# Patient Record
Sex: MALE | Race: WHITE | NOT HISPANIC OR LATINO | Employment: OTHER | URBAN - NONMETROPOLITAN AREA
[De-identification: names, ages, dates, MRNs, and addresses within clinical notes are randomized per-mention and may not be internally consistent; named-entity substitution may affect disease eponyms.]

---

## 2017-03-02 ENCOUNTER — TRANSCRIBE ORDERS (OUTPATIENT)
Dept: LAB | Facility: HOSPITAL | Age: 62
End: 2017-03-02

## 2017-03-02 ENCOUNTER — HOSPITAL ENCOUNTER (OUTPATIENT)
Dept: GENERAL RADIOLOGY | Facility: HOSPITAL | Age: 62
Discharge: HOME | End: 2017-03-02
Admitting: NURSE PRACTITIONER

## 2017-03-02 DIAGNOSIS — M54.5 LOW BACK PAIN, UNSPECIFIED BACK PAIN LATERALITY, UNSPECIFIED CHRONICITY, WITH SCIATICA PRESENCE UNSPECIFIED: ICD-10-CM

## 2017-03-02 DIAGNOSIS — M51.36 DEGENERATION OF LUMBAR INTERVERTEBRAL DISC: Primary | ICD-10-CM

## 2017-03-02 DIAGNOSIS — M51.36 DEGENERATION OF LUMBAR INTERVERTEBRAL DISC: ICD-10-CM

## 2017-03-02 PROCEDURE — 72110 X-RAY EXAM L-2 SPINE 4/>VWS: CPT

## 2017-03-14 ENCOUNTER — APPOINTMENT (OUTPATIENT)
Dept: CT IMAGING | Age: 62
End: 2017-03-14
Payer: COMMERCIAL

## 2017-03-14 ENCOUNTER — HOSPITAL ENCOUNTER (EMERGENCY)
Age: 62
Discharge: HOME | End: 2017-03-14
Payer: COMMERCIAL

## 2017-03-14 VITALS
HEART RATE: 64 BPM | SYSTOLIC BLOOD PRESSURE: 139 MMHG | DIASTOLIC BLOOD PRESSURE: 82 MMHG | WEIGHT: 233 LBS | OXYGEN SATURATION: 96 % | RESPIRATION RATE: 18 BRPM | BODY MASS INDEX: 32.62 KG/M2 | TEMPERATURE: 97.9 F | HEIGHT: 71 IN

## 2017-03-14 DIAGNOSIS — M54.31 SCIATICA OF RIGHT SIDE: Primary | ICD-10-CM

## 2017-03-14 DIAGNOSIS — R10.31 RIGHT LOWER QUADRANT ABDOMINAL PAIN: ICD-10-CM

## 2017-03-14 LAB
ALBUMIN SERPL-MCNC: 4.4 G/DL (ref 3.5–5.2)
ALP BLD-CCNC: 65 U/L (ref 40–130)
ALT SERPL-CCNC: 25 U/L (ref 5–41)
ANION GAP SERPL CALCULATED.3IONS-SCNC: 9 MMOL/L (ref 7–19)
AST SERPL-CCNC: 20 U/L (ref 5–40)
BILIRUB SERPL-MCNC: 0.3 MG/DL (ref 0.2–1.2)
BILIRUBIN URINE: NEGATIVE
BLOOD, URINE: NEGATIVE
BUN BLDV-MCNC: 19 MG/DL (ref 8–23)
CALCIUM SERPL-MCNC: 9.3 MG/DL (ref 8.8–10.2)
CHLORIDE BLD-SCNC: 103 MMOL/L (ref 98–111)
CLARITY: CLEAR
CO2: 27 MMOL/L (ref 22–29)
COLOR: YELLOW
CREAT SERPL-MCNC: 1.1 MG/DL (ref 0.5–1.2)
GFR NON-AFRICAN AMERICAN: >60
GLOBULIN: 2.3 G/DL
GLUCOSE BLD-MCNC: 94 MG/DL (ref 74–109)
GLUCOSE URINE: NEGATIVE MG/DL
HCT VFR BLD CALC: 42.2 % (ref 42–52)
HEMOGLOBIN: 14.7 G/DL (ref 14–18)
KETONES, URINE: NEGATIVE MG/DL
LEUKOCYTE ESTERASE, URINE: NEGATIVE
MCH RBC QN AUTO: 30.9 PG (ref 27–31)
MCHC RBC AUTO-ENTMCNC: 34.8 G/DL (ref 33–37)
MCV RBC AUTO: 88.7 FL (ref 80–94)
NITRITE, URINE: NEGATIVE
PDW BLD-RTO: 13.7 % (ref 11.5–14.5)
PH UA: 5.5
PLATELET # BLD: 226 K/UL (ref 130–400)
PMV BLD AUTO: 8.7 FL (ref 7.4–10.4)
POTASSIUM SERPL-SCNC: 4.4 MMOL/L (ref 3.5–5)
PROTEIN UA: NEGATIVE MG/DL
RBC # BLD: 4.76 M/UL (ref 4.7–6.1)
SODIUM BLD-SCNC: 139 MMOL/L (ref 136–145)
SPECIFIC GRAVITY UA: >1.045
TOTAL PROTEIN: 6.7 G/DL (ref 6.6–8.7)
UROBILINOGEN, URINE: 1 E.U./DL
WBC # BLD: 7.1 K/UL (ref 4.8–10.8)

## 2017-03-14 PROCEDURE — 81003 URINALYSIS AUTO W/O SCOPE: CPT

## 2017-03-14 PROCEDURE — 74177 CT ABD & PELVIS W/CONTRAST: CPT

## 2017-03-14 PROCEDURE — 96375 TX/PRO/DX INJ NEW DRUG ADDON: CPT

## 2017-03-14 PROCEDURE — 36415 COLL VENOUS BLD VENIPUNCTURE: CPT

## 2017-03-14 PROCEDURE — 6360000004 HC RX CONTRAST MEDICATION: Performed by: NURSE PRACTITIONER

## 2017-03-14 PROCEDURE — 96376 TX/PRO/DX INJ SAME DRUG ADON: CPT

## 2017-03-14 PROCEDURE — 99284 EMERGENCY DEPT VISIT MOD MDM: CPT

## 2017-03-14 PROCEDURE — 96374 THER/PROPH/DIAG INJ IV PUSH: CPT

## 2017-03-14 PROCEDURE — 80053 COMPREHEN METABOLIC PANEL: CPT

## 2017-03-14 PROCEDURE — 6360000002 HC RX W HCPCS: Performed by: NURSE PRACTITIONER

## 2017-03-14 PROCEDURE — 85027 COMPLETE CBC AUTOMATED: CPT

## 2017-03-14 PROCEDURE — 99283 EMERGENCY DEPT VISIT LOW MDM: CPT | Performed by: NURSE PRACTITIONER

## 2017-03-14 RX ORDER — KETOROLAC TROMETHAMINE 30 MG/ML
15 INJECTION, SOLUTION INTRAMUSCULAR; INTRAVENOUS ONCE
Status: COMPLETED | OUTPATIENT
Start: 2017-03-14 | End: 2017-03-14

## 2017-03-14 RX ORDER — CYCLOBENZAPRINE HCL 10 MG
10 TABLET ORAL 3 TIMES DAILY PRN
Qty: 30 TABLET | Refills: 0 | Status: SHIPPED | OUTPATIENT
Start: 2017-03-14 | End: 2019-05-20 | Stop reason: ALTCHOICE

## 2017-03-14 RX ORDER — METHYLPREDNISOLONE 4 MG/1
TABLET ORAL
Qty: 1 KIT | Refills: 0 | Status: SHIPPED | OUTPATIENT
Start: 2017-03-14 | End: 2017-03-20

## 2017-03-14 RX ORDER — ONDANSETRON 2 MG/ML
4 INJECTION INTRAMUSCULAR; INTRAVENOUS ONCE
Status: COMPLETED | OUTPATIENT
Start: 2017-03-14 | End: 2017-03-14

## 2017-03-14 RX ADMIN — HYDROMORPHONE HYDROCHLORIDE 1 MG: 1 INJECTION, SOLUTION INTRAMUSCULAR; INTRAVENOUS; SUBCUTANEOUS at 14:44

## 2017-03-14 RX ADMIN — IOVERSOL 100 ML: 741 INJECTION INTRA-ARTERIAL; INTRAVENOUS at 15:30

## 2017-03-14 RX ADMIN — HYDROMORPHONE HYDROCHLORIDE 1 MG: 1 INJECTION, SOLUTION INTRAMUSCULAR; INTRAVENOUS; SUBCUTANEOUS at 17:14

## 2017-03-14 RX ADMIN — KETOROLAC TROMETHAMINE 15 MG: 30 INJECTION, SOLUTION INTRAMUSCULAR at 17:14

## 2017-03-14 RX ADMIN — ONDANSETRON 4 MG: 2 INJECTION INTRAMUSCULAR; INTRAVENOUS at 14:44

## 2017-03-14 ASSESSMENT — PAIN SCALES - GENERAL
PAINLEVEL_OUTOF10: 7

## 2017-03-14 ASSESSMENT — PAIN DESCRIPTION - ORIENTATION: ORIENTATION: RIGHT

## 2017-03-14 ASSESSMENT — PAIN DESCRIPTION - LOCATION: LOCATION: LEG

## 2017-03-14 ASSESSMENT — ENCOUNTER SYMPTOMS: RESPIRATORY NEGATIVE: 1

## 2017-03-14 ASSESSMENT — PAIN DESCRIPTION - PAIN TYPE: TYPE: ACUTE PAIN

## 2017-03-16 ENCOUNTER — TRANSCRIBE ORDERS (OUTPATIENT)
Dept: ADMINISTRATIVE | Facility: HOSPITAL | Age: 62
End: 2017-03-16

## 2017-03-16 DIAGNOSIS — I73.9 PERIPHERAL VASCULAR DISEASE, UNSPECIFIED (HCC): ICD-10-CM

## 2017-03-16 DIAGNOSIS — M51.36 DEGENERATION OF LUMBAR INTERVERTEBRAL DISC: Primary | ICD-10-CM

## 2017-03-17 ENCOUNTER — HOSPITAL ENCOUNTER (OUTPATIENT)
Dept: ULTRASOUND IMAGING | Facility: HOSPITAL | Age: 62
Discharge: HOME | End: 2017-03-17

## 2017-03-17 ENCOUNTER — TRANSCRIBE ORDERS (OUTPATIENT)
Dept: ULTRASOUND IMAGING | Facility: HOSPITAL | Age: 62
End: 2017-03-17

## 2017-03-17 ENCOUNTER — HOSPITAL ENCOUNTER (OUTPATIENT)
Dept: ULTRASOUND IMAGING | Facility: HOSPITAL | Age: 62
Discharge: HOME | End: 2017-03-17
Admitting: NURSE PRACTITIONER

## 2017-03-17 ENCOUNTER — HOSPITAL ENCOUNTER (OUTPATIENT)
Dept: GENERAL RADIOLOGY | Facility: HOSPITAL | Age: 62
Discharge: HOME | End: 2017-03-17

## 2017-03-17 DIAGNOSIS — M79.604 RIGHT LEG PAIN: ICD-10-CM

## 2017-03-17 DIAGNOSIS — I73.9 PERIPHERAL VASCULAR DISEASE, UNSPECIFIED (HCC): ICD-10-CM

## 2017-03-17 DIAGNOSIS — D49.59 NEOPLASM OF UNSPECIFIED BEHAVIOR OF OTHER GENITOURINARY ORGAN: ICD-10-CM

## 2017-03-17 DIAGNOSIS — M79.605 LEFT LEG PAIN: ICD-10-CM

## 2017-03-17 DIAGNOSIS — I73.9 PERIPHERAL VASCULAR DISEASE, UNSPECIFIED (HCC): Primary | ICD-10-CM

## 2017-03-17 PROCEDURE — 93970 EXTREMITY STUDY: CPT

## 2017-03-17 PROCEDURE — 76775 US EXAM ABDO BACK WALL LIM: CPT

## 2017-03-20 ENCOUNTER — TRANSCRIBE ORDERS (OUTPATIENT)
Dept: GENERAL RADIOLOGY | Facility: HOSPITAL | Age: 62
End: 2017-03-20

## 2017-03-20 ENCOUNTER — HOSPITAL ENCOUNTER (OUTPATIENT)
Dept: ULTRASOUND IMAGING | Facility: HOSPITAL | Age: 62
Discharge: HOME | End: 2017-03-20
Admitting: NURSE PRACTITIONER

## 2017-03-20 DIAGNOSIS — I73.9 PERIPHERAL VASCULAR DISEASE, UNSPECIFIED (HCC): ICD-10-CM

## 2017-03-20 PROCEDURE — 93922 UPR/L XTREMITY ART 2 LEVELS: CPT

## 2017-03-20 PROCEDURE — 93923 UPR/LXTR ART STDY 3+ LVLS: CPT

## 2017-03-22 ENCOUNTER — HOSPITAL ENCOUNTER (OUTPATIENT)
Dept: MRI IMAGING | Facility: HOSPITAL | Age: 62
Discharge: HOME | End: 2017-03-22
Admitting: NURSE PRACTITIONER

## 2017-03-22 DIAGNOSIS — M51.36 DEGENERATION OF LUMBAR INTERVERTEBRAL DISC: ICD-10-CM

## 2017-03-22 PROCEDURE — 72148 MRI LUMBAR SPINE W/O DYE: CPT

## 2017-05-12 ENCOUNTER — TELEPHONE (OUTPATIENT)
Dept: CARDIOLOGY | Age: 62
End: 2017-05-12

## 2017-07-11 DIAGNOSIS — I25.10 CORONARY ARTERY DISEASE INVOLVING NATIVE CORONARY ARTERY OF NATIVE HEART WITHOUT ANGINA PECTORIS: ICD-10-CM

## 2017-07-12 RX ORDER — METOPROLOL SUCCINATE 50 MG/1
TABLET, EXTENDED RELEASE ORAL
Qty: 30 TABLET | Refills: 5 | Status: SHIPPED | OUTPATIENT
Start: 2017-07-12 | End: 2017-10-11 | Stop reason: SDUPTHER

## 2017-10-11 ENCOUNTER — OFFICE VISIT (OUTPATIENT)
Dept: CARDIOLOGY | Age: 62
End: 2017-10-11
Payer: COMMERCIAL

## 2017-10-11 VITALS
WEIGHT: 242 LBS | SYSTOLIC BLOOD PRESSURE: 138 MMHG | BODY MASS INDEX: 34.65 KG/M2 | DIASTOLIC BLOOD PRESSURE: 86 MMHG | HEART RATE: 72 BPM | HEIGHT: 70 IN

## 2017-10-11 DIAGNOSIS — Z95.1 S/P CABG X 2: ICD-10-CM

## 2017-10-11 DIAGNOSIS — I10 ESSENTIAL HYPERTENSION: ICD-10-CM

## 2017-10-11 DIAGNOSIS — E78.2 MIXED HYPERLIPIDEMIA: ICD-10-CM

## 2017-10-11 DIAGNOSIS — I25.10 CORONARY ARTERY DISEASE INVOLVING NATIVE CORONARY ARTERY OF NATIVE HEART WITHOUT ANGINA PECTORIS: Primary | ICD-10-CM

## 2017-10-11 PROCEDURE — 99213 OFFICE O/P EST LOW 20 MIN: CPT | Performed by: NURSE PRACTITIONER

## 2017-10-11 RX ORDER — MELOXICAM 15 MG/1
15 TABLET ORAL PRN
COMMUNITY

## 2017-10-11 NOTE — PROGRESS NOTES
[facility] [place], [place]. [address] [facility]  [phone] office  [phone] fax      OFFICE VISIT:  10/11/2017    Kurt Mcdonald: 1955    Reason For Visit:  Marlin Alfaro is a 57 y.o. male who is here for Follow-up (Presents for cardiology follow up doing well.); Coronary Artery Disease; Hypertension; and Hyperlipidemia    The patient presents today for cardiology follow up. Overall, the patient is doing well from a cardiac standpoint without symptoms to suggest myocardial ischemia. BP is well controlled on current regimen. The patient's PCP monitors cholesterol. Ruy Askew denies exertional chest pain, shortness of breath, orthopnea, paroxysmal nocturnal dyspnea, syncope, presyncope, sustained arrythmia, edema and fatigue. The patient denies numbness or weakness to suggest cerebrovascular accident or transient ischemic attack. Radha Steven has the following history as recorded in [facility]:    Patient Active Problem List   Diagnosis Code    Hypertension I10    Chronic obstructive pulmonary disease (COPD) ([facility] [address] J44.9    Dyspnea R06.00    Chest pain R07.9    H/O: rheumatic fever Z86.79    CAD (coronary artery disease) I25.10    S/P CABG x 2 Z95.1    Fatigue R53.83    Hyperlipidemia E78.5     Past Medical History:   Diagnosis Date    Arthritis     POLYARTICULAR    CAD (coronary artery disease)     CAD (coronary artery disease) 10/31/2012    S/P CABG    Chest pain     Chronic obstructive pulmonary disease (COPD) ([facility]     Depression     Dyspnea     GERD (gastroesophageal reflux disease)     H/O: rheumatic fever     Hyperlipidemia     Cholesterol management per pcp.  Hypertension      Past Surgical History:   Procedure Laterality Date    CARDIAC CATHETERIZATION  9/11/12   MDL    with aortic root injection.       CHOLECYSTECTOMY      COLONOSCOPY      CORONARY ARTERY BYPASS GRAFT Cardiovascular  No jugular venous distention. Auscultation reveals regular rate and rhythm. No audible clicks, gallop or rub. No murmur. No lower extremity varicosities. No carotid bruits. Abdominal -  No visible distention, mass or pulsations. Extremities - No clubbing or cyanosis. No statis dermatitis or ulcers. No edema. Musculoskeletal -   No Osler's nodes. No kyphosis or scoliosis. Gait is even and regular without limp or shuffle. Ambulates without assistance. Skin -  Warm and dry; no rash or pallor. No new surgical wound. Neurological - No focal neurological deficits. Thought processes coherent. No apparent tremor. Oriented to person, place and time. Psychiatric -  Appropriate affect and mood. Assessment:    1. Coronary artery disease involving native coronary artery of native heart without angina pectoris     2. S/P CABG x 2      9/12/2012   3. Essential hypertension     4. Mixed hyperlipidemia       Stable CV status without symptoms of overt heart failure, arrhythmia or angina. Patient is a non smoker. Patient is compliant with medication regimen. BP Readings from Last 3 Encounters:   10/11/17 138/86   03/14/17 139/82   11/11/16 130/68    Pulse Readings from Last 3 Encounters:   10/11/17 72   03/14/17 64   11/11/16 68        Wt Readings from Last 3 Encounters:   10/11/17 242 lb (109.8 kg)   03/14/17 233 lb (105.7 kg)   11/11/16 229 lb 6.4 oz (104.1 kg)     Plan  Previous cardiac history and records reviewed. Continue current medications as prescribed. Continue to follow up with primary care provider for non cardiac medical problems. Call the office with any problems, questions or concerns at [phone]. Follow up as scheduled with your cardiologist - 6 months Dr. Contreras Villalobos.   The following educational material has been included in this after visit summary for your review: heart health.     Additional instructions:  Coronary artery disease risk factors you can control: Smoking, high blood pressure, high cholesterol, diabetes, being overweight, lack of exercise and stress. Continue heart healthy diet. Take medications as directed. Exercise as tolerated. Strive for 15 minutes of exercise most days of the week. If asked to keep a blood pressure log, do so for 2 weeks. Call the office to report readings at [phone]. Blood pressure goal is 140/90 or less. If you are a diabetic, the goal is 130/80 or less. If you are taking cholesterol lowering medications, it is recommended that lab work be checked annually.   Always keep a current medication list. Bring your medications to every office visit.       STEVENSON Niño

## 2018-01-13 DIAGNOSIS — I25.10 CORONARY ARTERY DISEASE INVOLVING NATIVE CORONARY ARTERY OF NATIVE HEART WITHOUT ANGINA PECTORIS: ICD-10-CM

## 2018-01-15 RX ORDER — METOPROLOL SUCCINATE 50 MG/1
TABLET, EXTENDED RELEASE ORAL
Qty: 30 TABLET | Refills: 5 | Status: SHIPPED | OUTPATIENT
Start: 2018-01-15 | End: 2018-04-18 | Stop reason: SDUPTHER

## 2018-04-18 ENCOUNTER — OFFICE VISIT (OUTPATIENT)
Dept: CARDIOLOGY | Age: 63
End: 2018-04-18
Payer: COMMERCIAL

## 2018-04-18 VITALS
WEIGHT: 240 LBS | BODY MASS INDEX: 35.55 KG/M2 | DIASTOLIC BLOOD PRESSURE: 86 MMHG | HEIGHT: 69 IN | SYSTOLIC BLOOD PRESSURE: 138 MMHG | HEART RATE: 82 BPM

## 2018-04-18 DIAGNOSIS — I25.10 ARTERIOSCLEROTIC HEART DISEASE (ASHD): Primary | ICD-10-CM

## 2018-04-18 DIAGNOSIS — I10 ESSENTIAL HYPERTENSION: ICD-10-CM

## 2018-04-18 PROCEDURE — 99213 OFFICE O/P EST LOW 20 MIN: CPT | Performed by: INTERNAL MEDICINE

## 2018-07-12 ENCOUNTER — OFFICE VISIT (OUTPATIENT)
Dept: RETAIL CLINIC | Facility: CLINIC | Age: 63
End: 2018-07-12

## 2018-07-12 VITALS
WEIGHT: 230.8 LBS | BODY MASS INDEX: 32.31 KG/M2 | RESPIRATION RATE: 18 BRPM | DIASTOLIC BLOOD PRESSURE: 84 MMHG | TEMPERATURE: 97.4 F | HEIGHT: 71 IN | SYSTOLIC BLOOD PRESSURE: 126 MMHG | HEART RATE: 74 BPM | OXYGEN SATURATION: 98 %

## 2018-07-12 DIAGNOSIS — L23.7 ALLERGIC CONTACT DERMATITIS DUE TO PLANTS, EXCEPT FOOD: Primary | ICD-10-CM

## 2018-07-12 PROCEDURE — 99213 OFFICE O/P EST LOW 20 MIN: CPT | Performed by: NURSE PRACTITIONER

## 2018-07-12 RX ORDER — TRIAMCINOLONE ACETONIDE 5 MG/G
CREAM TOPICAL 3 TIMES DAILY
Qty: 15 G | Refills: 0 | Status: SHIPPED | OUTPATIENT
Start: 2018-07-12

## 2018-07-12 NOTE — PROGRESS NOTES
Chief Complaint   Patient presents with   • Rash     Subjective   Jim Singer is a 63 y.o. male who presents to the clinic today with complaints of rash.   Rash   This is a new problem. Episode onset: two days ago. The problem has been gradually worsening since onset. The affected locations include the left arm and left hand. The rash is characterized by redness and itchiness. Associated with: probable plant contact while on his tractor. Pertinent negatives include no cough, fatigue, fever, shortness of breath or vomiting. Treatments tried: Ivyrest, Anti-itch.     Current Outpatient Prescriptions:   •  ALLOPURINOL PO, Take  by mouth., Disp: , Rfl:   •  ASPIRIN PO, Take  by mouth., Disp: , Rfl:   •  Escitalopram Oxalate (LEXAPRO PO), Take  by mouth., Disp: , Rfl:   •  Hydrocodone-Acetaminophen (NORCO PO), Take  by mouth., Disp: , Rfl:   •  LISINOPRIL PO, Take  by mouth., Disp: , Rfl:   •  METOPROLOL SUCCINATE ER PO, Take  by mouth., Disp: , Rfl:   •  TEMAZEPAM PO, Take  by mouth., Disp: , Rfl:     Allergies:  Librium [chlordiazepoxide]    Past Medical History:   Diagnosis Date   • Acid reflux    • COPD (chronic obstructive pulmonary disease) (CMS/[facility])    • Heart disease      Past Surgical History:   Procedure Laterality Date   • CHOLECYSTECTOMY     • CORONARY ARTERY BYPASS GRAFT       Family History   Problem Relation Age of Onset   • Alzheimer's disease Mother    • Lung disease Father      Social History   Substance Use Topics   • Smoking status: Never Smoker   • Smokeless tobacco: Current User   • Alcohol use No       Review of Systems  Review of Systems   Constitutional: Negative for fatigue and fever.   HENT: Negative.    Respiratory: Negative for cough, shortness of breath and wheezing.    Gastrointestinal: Negative for vomiting.   Skin: Positive for rash.       Objective   /84 (BP Location: Left arm, Patient Position: Sitting, Cuff Size: Adult)   Pulse 74   Temp 97.4 °F (36.3 °C) (Oral)   Resp 18    "Ht 180.3 cm (71\")   Wt 105 kg (230 lb 12.8 oz)   SpO2 98%   BMI 32.19 kg/m²       Physical Exam   Constitutional: He appears well-developed and well-nourished. He is cooperative. No distress.   Cardiovascular: Normal rate, regular rhythm, S1 normal, S2 normal and normal heart sounds.    Pulmonary/Chest: Effort normal and breath sounds normal.   Neurological: He is alert.   Skin: Skin is warm and dry.        Psychiatric: He has a normal mood and affect. His speech is normal.   Vitals reviewed.      Assessment/Plan     Jim was seen today for rash.    Diagnoses and all orders for this visit:    Allergic contact dermatitis due to plants, except food    Other orders  -     triamcinolone (KENALOG) 0.5 % cream; Apply  topically 3 (Three) Times a Day.      Use cream as prescribed (do not use on face or genitals).  Monitor for s/s of infection.    "

## 2018-07-12 NOTE — PATIENT INSTRUCTIONS
Poison Ivy Dermatitis  Poison ivy dermatitis is inflammation of the skin that is caused by the allergens on the leaves of the poison ivy plant. The skin reaction often involves redness, swelling, blisters, and extreme itching.  What are the causes?  This condition is caused by a specific chemical (urushiol) found in the sap of the poison ivy plant. This chemical is sticky and can be easily spread to people, animals, and objects. You can get poison ivy dermatitis by:  · Having direct contact with a poison ivy plant.  · Touching animals, other people, or objects that have come in contact with poison ivy and have the chemical on them.    What increases the risk?  This condition is more likely to develop in:  · People who are outdoors often.  · People who go outdoors without wearing protective clothing, such as closed shoes, long pants, and a long-sleeved shirt.    What are the signs or symptoms?  Symptoms of this condition include:  · Redness and itching.  · A rash that often includes bumps and blisters. The rash usually appears 48 hours after exposure.  · Swelling. This may occur if the reaction is more severe.    Symptoms usually last for 1-2 weeks. However, the first time you develop this condition, symptoms may last 3-4 weeks.  How is this diagnosed?  This condition may be diagnosed based on your symptoms and a physical exam. Your health care provider may also ask you about any recent outdoor activity.  How is this treated?  Treatment for this condition will vary depending on how severe it is. Treatment may include:  · Hydrocortisone creams or calamine lotions to relieve itching.  · Oatmeal baths to soothe the skin.  · Over-the-counter antihistamine tablets.  · Oral steroid medicine for more severe outbreaks.    Follow these instructions at home:  · Take or apply over-the-counter and prescription medicines only as told by your health care provider.  · Wash exposed skin as soon as possible with soap and cold  water.  · Use hydrocortisone creams or calamine lotion as needed to soothe the skin and relieve itching.  · Take oatmeal baths as needed. Use colloidal oatmeal. You can get this at your local pharmacy or grocery store. Follow the instructions on the packaging.  · Do not scratch or rub your skin.  · While you have the rash, wash clothes right after you wear them.  How is this prevented?  · Learn to identify the poison ivy plant and avoid contact with the plant. This plant can be recognized by the number of leaves. Generally, poison ivy has three leaves with flowering branches on a single stem. The leaves are typically glossy, and they have jagged edges that come to a point at the front.  · If you have been exposed to poison ivy, thoroughly wash with soap and water right away. You have about 30 minutes to remove the plant resin before it will cause the rash. Be sure to wash under your fingernails because any plant resin there will continue to spread the rash.  · When hiking or camping, wear clothes that will help you to avoid exposure on the skin. This includes long pants, a long-sleeved shirt, tall socks, and hiking boots. You can also apply preventive lotion to your skin to help limit exposure.  · If you suspect that your clothes or outdoor gear came in contact with poison ivy, rinse them off outside with a garden hose before you bring them inside your house.  Contact a health care provider if:  · You have open sores in the rash area.  · You have more redness, swelling, or pain in the affected area.  · You have redness that spreads beyond the rash area.  · You have fluid, blood, or pus coming from the affected area.  · You have a fever.  · You have a rash over a large area of your body.  · You have a rash on your eyes, mouth, or genitals.  · Your rash does not improve after a few days.  Get help right away if:  · Your face swells or your eyes swell shut.  · You have trouble breathing.  · You have trouble  swallowing.  This information is not intended to replace advice given to you by your health care provider. Make sure you discuss any questions you have with your health care provider.  Document Released: 12/15/2001 Document Revised: 05/25/2017 Document Reviewed: 05/25/2016  [employer] Interactive Patient Education © 2018 [employer]

## 2018-07-16 DIAGNOSIS — I25.10 CORONARY ARTERY DISEASE INVOLVING NATIVE CORONARY ARTERY OF NATIVE HEART WITHOUT ANGINA PECTORIS: ICD-10-CM

## 2018-07-16 RX ORDER — METOPROLOL SUCCINATE 50 MG/1
TABLET, EXTENDED RELEASE ORAL
Qty: 90 TABLET | Refills: 3 | Status: SHIPPED | OUTPATIENT
Start: 2018-07-16 | End: 2018-11-13 | Stop reason: SDUPTHER

## 2018-08-22 ENCOUNTER — TELEPHONE (OUTPATIENT)
Dept: CARDIOLOGY | Age: 63
End: 2018-08-22

## 2018-08-23 ENCOUNTER — TELEPHONE (OUTPATIENT)
Dept: CARDIOLOGY | Age: 63
End: 2018-08-23

## 2018-11-13 ENCOUNTER — OFFICE VISIT (OUTPATIENT)
Dept: CARDIOLOGY | Age: 63
End: 2018-11-13
Payer: COMMERCIAL

## 2018-11-13 VITALS
HEART RATE: 80 BPM | BODY MASS INDEX: 35.25 KG/M2 | DIASTOLIC BLOOD PRESSURE: 80 MMHG | WEIGHT: 238 LBS | SYSTOLIC BLOOD PRESSURE: 132 MMHG | HEIGHT: 69 IN

## 2018-11-13 DIAGNOSIS — I10 ESSENTIAL HYPERTENSION: ICD-10-CM

## 2018-11-13 DIAGNOSIS — I25.10 CORONARY ARTERY DISEASE INVOLVING NATIVE CORONARY ARTERY OF NATIVE HEART WITHOUT ANGINA PECTORIS: ICD-10-CM

## 2018-11-13 DIAGNOSIS — Z95.1 S/P CABG X 2: ICD-10-CM

## 2018-11-13 DIAGNOSIS — Z23 FLU VACCINE NEED: Primary | ICD-10-CM

## 2018-11-13 DIAGNOSIS — E78.2 MIXED HYPERLIPIDEMIA: ICD-10-CM

## 2018-11-13 PROCEDURE — 90471 IMMUNIZATION ADMIN: CPT | Performed by: NURSE PRACTITIONER

## 2018-11-13 PROCEDURE — 99214 OFFICE O/P EST MOD 30 MIN: CPT | Performed by: NURSE PRACTITIONER

## 2018-11-13 PROCEDURE — 90686 IIV4 VACC NO PRSV 0.5 ML IM: CPT | Performed by: NURSE PRACTITIONER

## 2018-11-13 PROCEDURE — 93000 ELECTROCARDIOGRAM COMPLETE: CPT | Performed by: NURSE PRACTITIONER

## 2018-11-13 NOTE — PATIENT INSTRUCTIONS
Continue current medications as prescribed. Continue to follow up with primary care provider for non cardiac medical problems. Call the office with any problems, questions or concerns at [phone]. Follow up as scheduled with your cardiologist - Dr. Sarai Butcher 6 months. The following educational material has been included in this after visit summary for your review: Life simple 7. Heart health.     Additional instructions:  Coronary artery disease risk factors you can control: Smoking, high blood pressure, high cholesterol, diabetes, being overweight, lack of exercise and stress. Continue heart healthy diet. Take medications as directed. Exercise as tolerated. Strive for 15 minutes of exercise most days of the week. If asked to keep a blood pressure log, do so for 2 weeks. Call the office to report readings at [phone]. Blood pressure goal  is less than 120/70. Elevated blood pressure at 120-129/80 or less. High blood pressure at 130-139/80-89. If you are taking cholesterol lowering medications, it is recommended that lab work be checked annually. Always keep a current medication list. Bring your medications to every office visit. Life simple 7  1) Manage blood pressure - high blood pressure is a major risk factor for heart disease and stroke. Keeping blood pressure in health range reduces strain on your heart, arteries and kidneys. 2) Control cholesterol - contributes to plaque, which can clog arteries and lead to heart disease and stroke. When you control your cholesterol you are giving your arteries their best chance to remain clear. 3) Reduce blood sugar - most of the food we eat is turning into glucose or blood sugar that our body uses for energy. Over time, high levels of blood sugar can damage your heart, kidneys, eyes and nerves. 4) Get active - living an active life is one of the most rewarding gifts you can give yourself and those you love.   Simply put, daily physical

## 2018-11-13 NOTE — PROGRESS NOTES
[facility] [place], [place]. [address] [facility] [address] [facility]  [phone] office  [phone] fax      OFFICE VISIT:  11/13/2018    Kurt Mcdonald: 1955    Reason For Visit:  Samuel Cheng is a 70 y.o. male who is here for 6 Month Follow-Up (Patient presents for cardiology follow up doing well.); Coronary Artery Disease; Hypertension; and Hyperlipidemia    The patient presents today for cardiology follow up. Overall, the patient is doing well from a cardiac standpoint without symptoms to suggest myocardial ischemia. BP is well controlled on current regimen. The patient's PCP monitors cholesterol. Lyn s denies exertional chest pain, shortness of breath, orthopnea, paroxysmal nocturnal dyspnea, syncope, presyncope, sustained arrythmia, edema and fatigue. The patient denies numbness or weakness to suggest cerebrovascular accident or transient ischemic attack. Fabiano High has the following history as recorded in [facility]:    Patient Active Problem List   Diagnosis Code    Hypertension I10    Chronic obstructive pulmonary disease (COPD) ([facility] [address] J44.9    Dyspnea R06.00    Chest pain R07.9    H/O: rheumatic fever Z86.79    CAD (coronary artery disease) I25.10    S/P CABG x 2 Z95.1    Fatigue R53.83    Hyperlipidemia E78.5     Past Medical History:   Diagnosis Date    Arthritis     POLYARTICULAR    CAD (coronary artery disease)     CAD (coronary artery disease) 10/31/2012    S/P CABG    Chest pain     Chronic obstructive pulmonary disease (COPD) (HCC)     Depression     Dyspnea     GERD (gastroesophageal reflux disease)     H/O: rheumatic fever     Hyperlipidemia     Cholesterol management per pcp.  Hypertension      Past Surgical History:   Procedure Laterality Date    CARDIAC CATHETERIZATION  9/11/12   MDL    with aortic root injection.       CHOLECYSTECTOMY      COLONOSCOPY      CORONARY pulsations. Extremities - No clubbing or cyanosis. No statis dermatitis or ulcers. No edema. Musculoskeletal -   No Osler's nodes. No kyphosis or scoliosis. Gait is even and regular without limp or shuffle. Ambulates without assistance. Skin -  Warm and dry; no rash or pallor. No new surgical wound. Neurological - No focal neurological deficits. Thought processes coherent. No apparent tremor. Oriented to person, place and time. Psychiatric -  Appropriate affect and mood. Assessment:     Diagnosis Orders   1. Coronary artery disease involving native coronary artery of native heart without angina pectoris  EKG 12 lead   2. S/P CABG x 2     3. Essential hypertension     4. Mixed hyperlipidemia       EKG reviewed:  NSR 80 bpm; LVH voltage; QRS .116; no ectopy. Stable CV status without symptoms of overt heart failure, arrhythmia or angina. CAD medical management includes Toprol and Lisinopril. PCP follows lipids. Influenza vaccine administered today. Patient is compliant with medication regimen. BP Readings from Last 3 Encounters:   11/13/18 132/80   04/18/18 138/86   10/11/17 138/86    Pulse Readings from Last 3 Encounters:   11/13/18 80   04/18/18 82   10/11/17 72        Wt Readings from Last 3 Encounters:   11/13/18 238 lb (108 kg)   04/18/18 240 lb (108.9 kg)   10/11/17 242 lb (109.8 kg)     Plan  Previous cardiac history and records reviewed. Continue current medications as prescribed. Continue to follow up with primary care provider for non cardiac medical problems. Call the office with any problems, questions or concerns at [phone]. Follow up as scheduled with your cardiologist - Dr. Angela Mccann 6 months. The following educational material has been included in this after visit summary for your review: Life Shake 7.   Heart health.     Additional instructions:  Coronary artery disease risk factors you can control: Smoking, high blood pressure, high cholesterol, diabetes,

## 2018-11-15 ENCOUNTER — TRANSCRIBE ORDERS (OUTPATIENT)
Dept: ADMINISTRATIVE | Facility: HOSPITAL | Age: 63
End: 2018-11-15

## 2018-11-15 ENCOUNTER — HOSPITAL ENCOUNTER (OUTPATIENT)
Dept: GENERAL RADIOLOGY | Facility: HOSPITAL | Age: 63
Discharge: HOME | End: 2018-11-15
Admitting: NURSE PRACTITIONER

## 2018-11-15 DIAGNOSIS — K21.9 MILD ACID REFLUX: Primary | ICD-10-CM

## 2018-11-15 DIAGNOSIS — K21.9 GERD WITHOUT ESOPHAGITIS: Primary | ICD-10-CM

## 2018-11-15 PROCEDURE — 71046 X-RAY EXAM CHEST 2 VIEWS: CPT

## 2018-11-20 ENCOUNTER — HOSPITAL ENCOUNTER (OUTPATIENT)
Dept: GENERAL RADIOLOGY | Facility: HOSPITAL | Age: 63
Discharge: HOME | End: 2018-11-20
Admitting: NURSE PRACTITIONER

## 2018-11-20 DIAGNOSIS — K21.9 GERD WITHOUT ESOPHAGITIS: ICD-10-CM

## 2018-11-20 PROCEDURE — 63710000001 BARIUM SULFATE 700 MG TABLET: Performed by: NURSE PRACTITIONER

## 2018-11-20 PROCEDURE — 63710000001 BARIUM SULFATE 98 % RECONSTITUTED SUSPENSION: Performed by: NURSE PRACTITIONER

## 2018-11-20 PROCEDURE — A9270 NON-COVERED ITEM OR SERVICE: HCPCS | Performed by: NURSE PRACTITIONER

## 2018-11-20 PROCEDURE — 63710000001 BARIUM SULFATE 96 % RECONSTITUTED SUSPENSION: Performed by: NURSE PRACTITIONER

## 2018-11-20 PROCEDURE — 63710000001 SOD BICARB-CITRIC ACID-SIMETHICONE 2.21-1.53-0.04 G PACK: Performed by: NURSE PRACTITIONER

## 2018-11-20 PROCEDURE — 74246 X-RAY XM UPR GI TRC 2CNTRST: CPT

## 2018-11-20 RX ADMIN — ANTACID/ANTIFLATULENT 1 TABLET: 380; 550; 10; 10 GRANULE, EFFERVESCENT ORAL at 09:19

## 2018-11-20 RX ADMIN — BARIUM SULFATE 120 ML: 980 POWDER, FOR SUSPENSION ORAL at 09:19

## 2018-11-20 RX ADMIN — BARIUM SULFATE 240 ML: 960 POWDER, FOR SUSPENSION ORAL at 09:18

## 2018-11-20 RX ADMIN — BARIUM SULFATE 700 MG: 700 TABLET ORAL at 09:18

## 2019-04-08 ENCOUNTER — TRANSCRIBE ORDERS (OUTPATIENT)
Dept: ADMINISTRATIVE | Facility: HOSPITAL | Age: 64
End: 2019-04-08

## 2019-04-08 ENCOUNTER — HOSPITAL ENCOUNTER (OUTPATIENT)
Dept: GENERAL RADIOLOGY | Facility: HOSPITAL | Age: 64
Discharge: HOME | End: 2019-04-08

## 2019-04-08 ENCOUNTER — HOSPITAL ENCOUNTER (OUTPATIENT)
Dept: ULTRASOUND IMAGING | Facility: HOSPITAL | Age: 64
Discharge: HOME | End: 2019-04-08
Admitting: NURSE PRACTITIONER

## 2019-04-08 DIAGNOSIS — M79.604 PAIN IN RIGHT LEG: ICD-10-CM

## 2019-04-08 DIAGNOSIS — M79.604 RIGHT LEG PAIN: Primary | ICD-10-CM

## 2019-04-08 DIAGNOSIS — S80.819A ABRASION, UNSPECIFIED LOWER LEG, INITIAL ENCOUNTER: ICD-10-CM

## 2019-04-08 DIAGNOSIS — S80.11XA CONTUSION OF RIGHT LOWER LEG, INITIAL ENCOUNTER: ICD-10-CM

## 2019-04-08 PROCEDURE — 73590 X-RAY EXAM OF LOWER LEG: CPT

## 2019-04-08 PROCEDURE — 93971 EXTREMITY STUDY: CPT

## 2019-05-03 ENCOUNTER — HOSPITAL ENCOUNTER (OUTPATIENT)
Dept: CARDIOLOGY | Facility: HOSPITAL | Age: 64
Discharge: HOME | End: 2019-05-03
Admitting: NURSE PRACTITIONER

## 2019-05-03 ENCOUNTER — TRANSCRIBE ORDERS (OUTPATIENT)
Dept: ADMINISTRATIVE | Facility: HOSPITAL | Age: 64
End: 2019-05-03

## 2019-05-03 ENCOUNTER — LAB (OUTPATIENT)
Dept: LAB | Facility: HOSPITAL | Age: 64
End: 2019-05-03

## 2019-05-03 DIAGNOSIS — R07.9 CHEST PAIN, UNSPECIFIED TYPE: ICD-10-CM

## 2019-05-03 DIAGNOSIS — R07.9 CHEST PAIN, UNSPECIFIED TYPE: Primary | ICD-10-CM

## 2019-05-03 DIAGNOSIS — R29.810 FACIAL WEAKNESS: ICD-10-CM

## 2019-05-03 DIAGNOSIS — R53.83 OTHER FATIGUE: ICD-10-CM

## 2019-05-03 LAB
ALBUMIN SERPL-MCNC: 4.3 G/DL (ref 3.5–5)
ALBUMIN/GLOB SERPL: 1.4 G/DL (ref 1.1–2.5)
ALP SERPL-CCNC: 60 U/L (ref 24–120)
ALT SERPL W P-5'-P-CCNC: 40 U/L (ref 0–54)
ANION GAP SERPL CALCULATED.3IONS-SCNC: 11 MMOL/L (ref 4–13)
AST SERPL-CCNC: 51 U/L (ref 7–45)
BASOPHILS # BLD AUTO: 0.07 10*3/MM3 (ref 0–0.2)
BASOPHILS NFR BLD AUTO: 1.1 % (ref 0–2)
BILIRUB SERPL-MCNC: 0.6 MG/DL (ref 0.1–1)
BUN BLD-MCNC: 16 MG/DL (ref 5–21)
BUN/CREAT SERPL: 11.6 (ref 7–25)
CALCIUM SPEC-SCNC: 9.2 MG/DL (ref 8.4–10.4)
CHLORIDE SERPL-SCNC: 106 MMOL/L (ref 98–110)
CK MB SERPL-CCNC: 0.42 NG/ML (ref 0–5)
CO2 SERPL-SCNC: 25 MMOL/L (ref 24–31)
CREAT BLD-MCNC: 1.38 MG/DL (ref 0.5–1.4)
DEPRECATED RDW RBC AUTO: 42.8 FL (ref 40–54)
EOSINOPHIL # BLD AUTO: 0.61 10*3/MM3 (ref 0–0.7)
EOSINOPHIL NFR BLD AUTO: 9.8 % (ref 0–4)
ERYTHROCYTE [DISTWIDTH] IN BLOOD BY AUTOMATED COUNT: 13.2 % (ref 12–15)
GFR SERPL CREATININE-BSD FRML MDRD: 52 ML/MIN/1.73
GLOBULIN UR ELPH-MCNC: 3.1 GM/DL
GLUCOSE BLD-MCNC: 95 MG/DL (ref 70–100)
HCT VFR BLD AUTO: 42.8 % (ref 40–52)
HGB BLD-MCNC: 14.8 G/DL (ref 14–18)
IMM GRANULOCYTES # BLD AUTO: 0.04 10*3/MM3 (ref 0–0.05)
IMM GRANULOCYTES NFR BLD AUTO: 0.6 % (ref 0–5)
LYMPHOCYTES # BLD AUTO: 1.33 10*3/MM3 (ref 0.72–4.86)
LYMPHOCYTES NFR BLD AUTO: 21.4 % (ref 15–45)
MCH RBC QN AUTO: 30.5 PG (ref 28–32)
MCHC RBC AUTO-ENTMCNC: 34.6 G/DL (ref 33–36)
MCV RBC AUTO: 88.1 FL (ref 82–95)
MONOCYTES # BLD AUTO: 0.66 10*3/MM3 (ref 0.19–1.3)
MONOCYTES NFR BLD AUTO: 10.6 % (ref 4–12)
MYOGLOBIN SERPL-MCNC: 48.6 NG/ML (ref 0–110)
NEUTROPHILS # BLD AUTO: 3.5 10*3/MM3 (ref 1.87–8.4)
NEUTROPHILS NFR BLD AUTO: 56.5 % (ref 39–78)
NRBC BLD AUTO-RTO: 0 /100 WBC (ref 0–0.2)
PLATELET # BLD AUTO: 226 10*3/MM3 (ref 130–400)
PMV BLD AUTO: 9.1 FL (ref 6–12)
POTASSIUM BLD-SCNC: 4 MMOL/L (ref 3.5–5.3)
PROT SERPL-MCNC: 7.4 G/DL (ref 6.3–8.7)
RBC # BLD AUTO: 4.86 10*6/MM3 (ref 4.8–5.9)
SODIUM BLD-SCNC: 142 MMOL/L (ref 135–145)
T4 FREE SERPL-MCNC: 0.81 NG/DL (ref 0.78–2.19)
TROPONIN I SERPL-MCNC: <0.012 NG/ML (ref 0–0.03)
TSH SERPL DL<=0.05 MIU/L-ACNC: 2.46 MIU/ML (ref 0.47–4.68)
VIT B12 BLD-MCNC: 557 PG/ML (ref 239–931)
WBC NRBC COR # BLD: 6.21 10*3/MM3 (ref 4.8–10.8)

## 2019-05-03 PROCEDURE — 84484 ASSAY OF TROPONIN QUANT: CPT | Performed by: NURSE PRACTITIONER

## 2019-05-03 PROCEDURE — 82553 CREATINE MB FRACTION: CPT | Performed by: NURSE PRACTITIONER

## 2019-05-03 PROCEDURE — 93010 ELECTROCARDIOGRAM REPORT: CPT | Performed by: INTERNAL MEDICINE

## 2019-05-03 PROCEDURE — 84439 ASSAY OF FREE THYROXINE: CPT | Performed by: NURSE PRACTITIONER

## 2019-05-03 PROCEDURE — 82607 VITAMIN B-12: CPT | Performed by: NURSE PRACTITIONER

## 2019-05-03 PROCEDURE — 84443 ASSAY THYROID STIM HORMONE: CPT | Performed by: NURSE PRACTITIONER

## 2019-05-03 PROCEDURE — 83874 ASSAY OF MYOGLOBIN: CPT | Performed by: NURSE PRACTITIONER

## 2019-05-03 PROCEDURE — 93005 ELECTROCARDIOGRAM TRACING: CPT | Performed by: NURSE PRACTITIONER

## 2019-05-03 PROCEDURE — 85025 COMPLETE CBC W/AUTO DIFF WBC: CPT | Performed by: NURSE PRACTITIONER

## 2019-05-03 PROCEDURE — 36415 COLL VENOUS BLD VENIPUNCTURE: CPT

## 2019-05-03 PROCEDURE — 80053 COMPREHEN METABOLIC PANEL: CPT | Performed by: NURSE PRACTITIONER

## 2019-05-06 ENCOUNTER — HOSPITAL ENCOUNTER (OUTPATIENT)
Dept: CARDIOLOGY | Facility: HOSPITAL | Age: 64
Discharge: HOME | End: 2019-05-06
Admitting: NURSE PRACTITIONER

## 2019-05-06 ENCOUNTER — HOSPITAL ENCOUNTER (OUTPATIENT)
Dept: ULTRASOUND IMAGING | Facility: HOSPITAL | Age: 64
Discharge: HOME | End: 2019-05-06

## 2019-05-06 ENCOUNTER — HOSPITAL ENCOUNTER (OUTPATIENT)
Dept: GENERAL RADIOLOGY | Facility: HOSPITAL | Age: 64
Discharge: HOME | End: 2019-05-06

## 2019-05-06 ENCOUNTER — TRANSCRIBE ORDERS (OUTPATIENT)
Dept: ADMINISTRATIVE | Facility: HOSPITAL | Age: 64
End: 2019-05-06

## 2019-05-06 VITALS
SYSTOLIC BLOOD PRESSURE: 123 MMHG | DIASTOLIC BLOOD PRESSURE: 70 MMHG | HEIGHT: 71 IN | WEIGHT: 245 LBS | HEART RATE: 88 BPM | BODY MASS INDEX: 34.3 KG/M2

## 2019-05-06 DIAGNOSIS — R53.83 TIREDNESS: ICD-10-CM

## 2019-05-06 DIAGNOSIS — R07.9 CHEST PAIN, UNSPECIFIED TYPE: Primary | ICD-10-CM

## 2019-05-06 DIAGNOSIS — R07.9 CHEST PAIN, UNSPECIFIED TYPE: ICD-10-CM

## 2019-05-06 LAB
BH CV STRESS BP STAGE 1: NORMAL
BH CV STRESS BP STAGE 2: NORMAL
BH CV STRESS BP STAGE 3: NORMAL
BH CV STRESS DURATION MIN STAGE 1: 3
BH CV STRESS DURATION MIN STAGE 2: 3
BH CV STRESS DURATION MIN STAGE 3: 0
BH CV STRESS DURATION SEC STAGE 1: 0
BH CV STRESS DURATION SEC STAGE 2: 0
BH CV STRESS DURATION SEC STAGE 3: 6
BH CV STRESS GRADE STAGE 1: 10
BH CV STRESS GRADE STAGE 2: 12
BH CV STRESS GRADE STAGE 3: 14
BH CV STRESS HR STAGE 1: 105
BH CV STRESS HR STAGE 2: 126
BH CV STRESS HR STAGE 3: 130
BH CV STRESS METS STAGE 1: 5
BH CV STRESS METS STAGE 2: 7.5
BH CV STRESS METS STAGE 3: 10
BH CV STRESS PROTOCOL 1: NORMAL
BH CV STRESS RECOVERY BP: NORMAL MMHG
BH CV STRESS RECOVERY HR: 87 BPM
BH CV STRESS SPEED STAGE 1: 1.7
BH CV STRESS SPEED STAGE 2: 2.5
BH CV STRESS SPEED STAGE 3: 3.4
BH CV STRESS STAGE 1: 1
BH CV STRESS STAGE 2: 2
BH CV STRESS STAGE 3: 3
MAXIMAL PREDICTED HEART RATE: 154 BPM
PERCENT MAX PREDICTED HR: 84.42 %
STRESS BASELINE BP: NORMAL MMHG
STRESS BASELINE HR: 88 BPM
STRESS PERCENT HR: 99 %
STRESS POST ESTIMATED WORKLOAD: 10 METS
STRESS POST EXERCISE DUR MIN: 6 MIN
STRESS POST EXERCISE DUR SEC: 6 SEC
STRESS POST PEAK BP: NORMAL MMHG
STRESS POST PEAK HR: 130 BPM
STRESS TARGET HR: 131 BPM

## 2019-05-06 PROCEDURE — 93880 EXTRACRANIAL BILAT STUDY: CPT

## 2019-05-06 PROCEDURE — 93018 CV STRESS TEST I&R ONLY: CPT | Performed by: INTERNAL MEDICINE

## 2019-05-06 PROCEDURE — 93017 CV STRESS TEST TRACING ONLY: CPT

## 2019-05-06 PROCEDURE — 71046 X-RAY EXAM CHEST 2 VIEWS: CPT

## 2019-05-06 PROCEDURE — 93880 EXTRACRANIAL BILAT STUDY: CPT | Performed by: SURGERY

## 2019-05-20 ENCOUNTER — HOSPITAL ENCOUNTER (OUTPATIENT)
Dept: CT IMAGING | Facility: HOSPITAL | Age: 64
Discharge: HOME | End: 2019-05-20
Admitting: NURSE PRACTITIONER

## 2019-05-20 ENCOUNTER — HOSPITAL ENCOUNTER (OUTPATIENT)
Dept: CARDIOLOGY | Facility: HOSPITAL | Age: 64
Discharge: HOME | End: 2019-05-20

## 2019-05-20 ENCOUNTER — TELEPHONE (OUTPATIENT)
Dept: CARDIOLOGY | Age: 64
End: 2019-05-20

## 2019-05-20 ENCOUNTER — TRANSCRIBE ORDERS (OUTPATIENT)
Dept: ADMINISTRATIVE | Facility: HOSPITAL | Age: 64
End: 2019-05-20

## 2019-05-20 ENCOUNTER — OFFICE VISIT (OUTPATIENT)
Dept: CARDIOLOGY | Age: 64
End: 2019-05-20
Payer: COMMERCIAL

## 2019-05-20 VITALS
HEART RATE: 66 BPM | SYSTOLIC BLOOD PRESSURE: 130 MMHG | BODY MASS INDEX: 33.88 KG/M2 | WEIGHT: 242 LBS | DIASTOLIC BLOOD PRESSURE: 80 MMHG | HEIGHT: 71 IN

## 2019-05-20 DIAGNOSIS — I25.10 CORONARY ARTERY DISEASE INVOLVING NATIVE CORONARY ARTERY OF NATIVE HEART, ANGINA PRESENCE UNSPECIFIED: ICD-10-CM

## 2019-05-20 DIAGNOSIS — R07.9 CHEST PAIN, UNSPECIFIED TYPE: ICD-10-CM

## 2019-05-20 DIAGNOSIS — Z95.1 S/P CABG X 2: ICD-10-CM

## 2019-05-20 DIAGNOSIS — I10 ESSENTIAL HYPERTENSION: ICD-10-CM

## 2019-05-20 DIAGNOSIS — R06.00 DYSPNEA, UNSPECIFIED TYPE: Primary | ICD-10-CM

## 2019-05-20 DIAGNOSIS — R06.09 DOE (DYSPNEA ON EXERTION): ICD-10-CM

## 2019-05-20 DIAGNOSIS — R53.83 FATIGUE, UNSPECIFIED TYPE: ICD-10-CM

## 2019-05-20 DIAGNOSIS — R07.9 CHEST PAIN, UNSPECIFIED TYPE: Primary | ICD-10-CM

## 2019-05-20 PROCEDURE — 93000 ELECTROCARDIOGRAM COMPLETE: CPT | Performed by: NURSE PRACTITIONER

## 2019-05-20 PROCEDURE — 99214 OFFICE O/P EST MOD 30 MIN: CPT | Performed by: NURSE PRACTITIONER

## 2019-05-20 PROCEDURE — 71275 CT ANGIOGRAPHY CHEST: CPT

## 2019-05-20 PROCEDURE — 0 IOPAMIDOL PER 1 ML: Performed by: NURSE PRACTITIONER

## 2019-05-20 PROCEDURE — 93005 ELECTROCARDIOGRAM TRACING: CPT

## 2019-05-20 PROCEDURE — 93010 ELECTROCARDIOGRAM REPORT: CPT | Performed by: INTERNAL MEDICINE

## 2019-05-20 RX ORDER — ISOSORBIDE MONONITRATE 30 MG/1
30 TABLET, EXTENDED RELEASE ORAL DAILY
Qty: 30 TABLET | Refills: 5 | Status: SHIPPED | OUTPATIENT
Start: 2019-05-20

## 2019-05-20 RX ADMIN — IOPAMIDOL 100 ML: 755 INJECTION, SOLUTION INTRAVENOUS at 11:14

## 2019-05-20 NOTE — PROGRESS NOTES
[facility], [place]. [address], [facility] [address] [facility]  [phone] office  [phone] fax      OFFICE VISIT:  5/20/2019    Kurt Mcdonald: 1955    Reason For Visit:  Corazon Suazo is a 75 y.o. male who is here for Follow-up (Patient complains of chest pain on exertion and SOA, off and on x 2 months.); Coronary Artery Disease; Hyperlipidemia; and Hypertension    The patient presents today for a problem visit. He report symptoms onset 2 months ago. He has been having left chest and arm pain with incline walking. At times, left jaw will ache as well. Reports associated fatigue and LICEA. Reports \"I am starting to feel like I did before my open heart surgery. \"  He did see his PCP today who ordered additional testing and NTG sl.  BP is well controlled on current regimen. The patient's PCP monitors cholesterol. Subjective  Dru denies  orthopnea, paroxysmal nocturnal dyspnea, syncope, presyncope, sustained arrhythmia and edema. The patient denies numbness or weakness to suggest cerebrovascular accident or transient ischemic attack. + left chest pain onset 2 months ago. Associated with incline walking. Radiation to left arm and left jaw. Describes as aching. Associated LICEA and fatigue. Symptom relief with rest.  Has not taken NTG sl for discomfort.     La Block has the following history as recorded in [facility]:    Patient Active Problem List   Diagnosis Code    Hypertension I10    Chronic obstructive pulmonary disease (COPD) ([facility] [address] J44.9    Dyspnea R06.00    Chest pain R07.9    H/O: rheumatic fever Z86.79    CAD (coronary artery disease) I25.10    S/P CABG x 2 Z95.1    Fatigue R53.83    Hyperlipidemia E78.5     Past Medical History:   Diagnosis Date    Arthritis     POLYARTICULAR    CAD (coronary artery disease)     CAD (coronary artery disease) 10/31/2012    S/P CABG    Chest pain     Chronic obstructive epistaxis. No tinnitus or significant hearing loss. Eyes - no sudden vision change or amaurosis. No corneal arcus, xantholasma, subconjunctival hemorrhage or discharge. Respiratory - no significant wheezing, stridor, apnea or cough.  + LICEA. Cardiovascular - no orthopnea or PND. No sensation of sustained arrythmia. No occurrence of slow heart rate. No palpitations. No claudication. No leg edema.  + left chest pain onset 2 months ago. Associated with incline walking. Radiation to left arm and left jaw. Describes as aching. Associated LICEA and fatigue. Symptom relief with rest.  Has not taken NTG sl for discomfort. Gastrointestinal - no abdominal swelling or pain. No blood in stool. No severe constipation, diarrhea, nausea, or vomiting. Genitourinary - no dysuria, frequency, or urgency. No flank pain or hematuria. Musculoskeletal - no back pain or myalgia. No problems with gait. Extremities - no clubbing, cyanosis or edema. Skin - no color change or rash. No pallor. No new surgical incision. Neurologic - no speech difficulty, facial asymmetry or lateralizing weakness. No seizures, presyncope or syncope. No significant dizziness. Hematologic - no easy bruising or excessive bleeding. Psychiatric - no severe anxiety or insomnia. No confusion. All other review of systems are negative. Objective  Vital Signs - /80   Pulse 66   Ht 5' 11\" (1.803 m)   Wt 242 lb (109.8 kg)   BMI 33.75 kg/m²   General - [facility] is alert, cooperative, and pleasant. Well groomed. No acute distress. Body habitus - Body mass index is 33.75 kg/m². HEENT - Head is normocephalic. No circumoral cyanosis. Dentition is normal.  EYES -   Lids normal without ptosis. No discharge, edema or subconjunctival hemorrhage. Neck - Symmetrical without apparent mass or lymphadenopathy. Respiratory - Normal respiratory effort without use of accessory muscles.   Ausculatation reveals vesicular breath sounds without crackles, wheezes, rub or rhonchi. Cardiovascular - No jugular venous distention. Auscultation reveals regular rate and rhythm. No audible clicks, gallop or rub. No murmur. No lower extremity varicosities. No carotid bruits. Abdominal -  No visible distention, mass or pulsations. Extremities - No clubbing or cyanosis. No statis dermatitis or ulcers. No edema. Musculoskeletal -   No Osler's nodes. No kyphosis or scoliosis. Gait is even and regular without limp or shuffle. Ambulates without assistance. Skin -  Warm and dry; no rash or pallor. No new surgical wound. Neurological - No focal neurological deficits. Thought processes coherent. No apparent tremor. Oriented to person, place and time. Psychiatric -  Appropriate affect and mood. Assessment:     Diagnosis Orders   1. Chest pain, unspecified type     2. Essential hypertension  EKG 12 lead   3. Coronary artery disease involving native coronary artery of native heart, angina presence unspecified     4. LICEA (dyspnea on exertion)     5. Fatigue, unspecified type     6. S/P CABG x 2       Data reviewed:  9/14/12 two-vessel perfusion-assisted coronary artery bypass grafting, placing the left internal mammary artery to the left anterior descending artery and saphenous vein graft to the posterior lateral marginal branch. Adjunctive transmyocardial laser revascularization completed    EKG reviewed:  NSR 65 BPM; no acute ischemic changes or ectopy. Symptoms suggestive of myocardial ischemic. Cardiac cath recommended. Patient agreeable to proceed. Procedure and risks explained. No allergy to iodinated contrast or shellfish. Not currently on metformin or oral anticoagulation therapy. Labs were completed today at PCP. Will need to obtain from Dr. Audrey Fung office. Start Imdur 30 mg (1) daily. Patient has NTG sl for prn use. Advised to rest until cath this Thursday and to go to ED with worsened symptoms. PCP follows lipids.   Patient is compliant with medication regimen. BP Readings from Last 3 Encounters:   05/20/19 130/80   11/13/18 132/80   04/18/18 138/86    Pulse Readings from Last 3 Encounters:   05/20/19 66   11/13/18 80   04/18/18 82        Wt Readings from Last 3 Encounters:   05/20/19 242 lb (109.8 kg)   11/13/18 238 lb (108 kg)   04/18/18 240 lb (108.9 kg)     Plan  Previous cardiac history and records reviewed. [place] at the [facility] and [address] [facility] located on the first floor of [place] [address] through hospital main entrance and turn immediately to your left. Date/Time: 5/23/19 arrive at 6:30AM for 7:30AM procedure  Pre-operative work-up:  CBC, BMP  Allergies:  Librium   Contact number:  [phone] (home)   Cardiac Catheterization Instructions   · Do not eat or drink anything after midnight (or 8 hours) prior to the procedure. Please take your morning medication with a small amount of water at your normally scheduled time, including any blood pressure pills, Plavix, and aspirin. · Coumadin (warfarin) should be stopped two days prior to this procedure. · Xarelto (ravaroxaban), Eliquis (apixaban), or Pradaxa (dabigatran) should be stopped one day prior to procedure. · Bring a list of the names and dosages of all the medications you are taking. · You must have someone to drive you home - you are not allowed to drive for 24 hours after your procedure. If an intervention is performed, you will stay overnight at the hospital.    · Further plan will depend upon the result of the cardiac catheterization. WHAT IS A CARDIAC CATHETERIZATION? This is a procedure that providers your cardiologist with detailed information regarding how your heart functions. A small catheter (long, fine tube) is inserted into an artery (a vessel that carries blood and oxygen) that leads to your heart.  While watching with x-ray equipment, small amounts of dye are injected which enables visualization of the heart arteries and the heart chambers. The pictures that your cardiologist receives from the cardiac catheterization enables him to decide on the best method of treatment for you. If for any reason you are unable to keep this appointment, please contact [facility], [phone], as soon as possible to reschedule. Instructions:  Start Imdur (isosorbide) 30 mg (1) tab daily for chest pain. If symptoms worsen, go to the emergency room. Continue other current medications as prescribed. Continue to follow up with primary care provider for non cardiac medical problems. Call the office with any problems, questions or concerns at [phone]. Follow up as scheduled with your cardiologist.  Dr. Jyothi Lynn as scheduled after cardiac catheterization. The following educational material has been included in this after visit summary for your review: Life simple 7. Cardiac catheterization.     Additional instructions:  Coronary artery disease risk factors you can control: Smoking, high blood pressure, high cholesterol, diabetes, being overweight, lack of exercise and stress. Continue heart healthy diet. Take medications as directed. If you are taking cholesterol lowering medications, it is recommended that lab work be checked annually. Always keep a current medication list. Bring your medications to every office visit. How to take:  NITROGLYCERIN (Nitrostat) 0.4 mg tablets, sublingual.  Nitroglycerin is in a group of drugs called nitrates. Nitroglycerin dilates (widens) blood vessels, making it easier for blood to flow through them and easier for the heart to pump. Dosing Guidelines for Nitroglycerin Tablets  · At the start of an angina (chest pain) attack, place one tablet under the tongue or between the cheek and gum. Do not swallow or chew the tablet; let it dissolve on its own. If necessary, a second and third tablet may be used, with five minutes between using each tablet.   If you use a third tablet and your chest pain continues, it is time to seek immediate medical attention. Call 911 immediately and have someone drive you to the emergency room. You may be having a heart attack or other serious heart problem. · To prevent angina from exercise or stress, use 1 tablet 5 to 10 minutes before the activity.      STEVENSON Miller

## 2019-05-20 NOTE — PATIENT INSTRUCTIONS
several hours after the test.  Follow-up care is a key part of your treatment and safety. Be sure to make and go to all appointments, and call your doctor if you are having problems. It's also a good idea to know your test results and keep a list of the medicines you take. Where can you learn more? Go to [email or web address] org and sign in to your [employer] account. Enter W306 in the [employer] box to learn more about \"Left Heart Catheterization: About This Test.\"     If you do not have an account, please click on the \"Sign Up Now\" link. Current as of: July 22, 2018  Content Version: 12.0  © 0922-1027 Healthwise, Incorporated. Care instructions adapted under license by [facility] ([facility]). If you have questions about a medical condition or this instruction, always ask your healthcare professional. [address] any warranty or liability for your use of this information.

## 2019-05-22 ENCOUNTER — TRANSCRIBE ORDERS (OUTPATIENT)
Dept: ADMINISTRATIVE | Facility: HOSPITAL | Age: 64
End: 2019-05-22

## 2019-05-22 DIAGNOSIS — I71.20 THORACIC AORTIC ANEURYSM WITHOUT RUPTURE (HCC): Primary | ICD-10-CM

## 2019-05-23 ENCOUNTER — TELEPHONE (OUTPATIENT)
Dept: CARDIOLOGY | Age: 64
End: 2019-05-23

## 2019-05-23 ENCOUNTER — HOSPITAL ENCOUNTER (OUTPATIENT)
Dept: CARDIAC CATH/INVASIVE PROCEDURES | Age: 64
Discharge: HOME | End: 2019-05-23
Attending: INTERNAL MEDICINE | Admitting: INTERNAL MEDICINE
Payer: MEDICARE

## 2019-05-23 VITALS
TEMPERATURE: 98 F | WEIGHT: 242 LBS | OXYGEN SATURATION: 96 % | HEIGHT: 71 IN | HEART RATE: 71 BPM | BODY MASS INDEX: 33.88 KG/M2 | DIASTOLIC BLOOD PRESSURE: 79 MMHG | RESPIRATION RATE: 18 BRPM | SYSTOLIC BLOOD PRESSURE: 143 MMHG

## 2019-05-23 DIAGNOSIS — I20.0 CRESCENDO ANGINA (HCC): ICD-10-CM

## 2019-05-23 LAB
ALBUMIN SERPL-MCNC: 4 G/DL (ref 3.5–5.2)
ALP BLD-CCNC: 50 U/L (ref 40–130)
ALT SERPL-CCNC: 33 U/L (ref 5–41)
ANION GAP SERPL CALCULATED.3IONS-SCNC: 11 MMOL/L (ref 7–19)
AST SERPL-CCNC: 33 U/L (ref 5–40)
BILIRUB SERPL-MCNC: 0.4 MG/DL (ref 0.2–1.2)
BUN BLDV-MCNC: 16 MG/DL (ref 8–23)
CALCIUM SERPL-MCNC: 8.8 MG/DL (ref 8.8–10.2)
CHLORIDE BLD-SCNC: 108 MMOL/L (ref 98–111)
CO2: 26 MMOL/L (ref 22–29)
CREAT SERPL-MCNC: 1.1 MG/DL (ref 0.5–1.2)
GFR NON-AFRICAN AMERICAN: >60
GLUCOSE BLD-MCNC: 110 MG/DL (ref 74–109)
HCT VFR BLD CALC: 39.8 % (ref 42–52)
HEMOGLOBIN: 13.6 G/DL (ref 14–18)
MCH RBC QN AUTO: 30.8 PG (ref 27–31)
MCHC RBC AUTO-ENTMCNC: 34.2 G/DL (ref 33–37)
MCV RBC AUTO: 90 FL (ref 80–94)
PDW BLD-RTO: 13.2 % (ref 11.5–14.5)
PLATELET # BLD: 186 K/UL (ref 130–400)
PMV BLD AUTO: 8.9 FL (ref 9.4–12.4)
POTASSIUM SERPL-SCNC: 3.7 MMOL/L (ref 3.5–5)
RBC # BLD: 4.42 M/UL (ref 4.7–6.1)
SODIUM BLD-SCNC: 145 MMOL/L (ref 136–145)
TOTAL PROTEIN: 6.7 G/DL (ref 6.6–8.7)
WBC # BLD: 6.3 K/UL (ref 4.8–10.8)

## 2019-05-23 PROCEDURE — 93459 L HRT ART/GRFT ANGIO: CPT

## 2019-05-23 PROCEDURE — C1760 CLOSURE DEV, VASC: HCPCS

## 2019-05-23 PROCEDURE — C1894 INTRO/SHEATH, NON-LASER: HCPCS

## 2019-05-23 PROCEDURE — 80053 COMPREHEN METABOLIC PANEL: CPT

## 2019-05-23 PROCEDURE — 6360000002 HC RX W HCPCS

## 2019-05-23 PROCEDURE — 99153 MOD SED SAME PHYS/QHP EA: CPT

## 2019-05-23 PROCEDURE — 2709999900 HC NON-CHARGEABLE SUPPLY

## 2019-05-23 PROCEDURE — 2500000003 HC RX 250 WO HCPCS

## 2019-05-23 PROCEDURE — 92928 PRQ TCAT PLMT NTRAC ST 1 LES: CPT | Performed by: INTERNAL MEDICINE

## 2019-05-23 PROCEDURE — C1725 CATH, TRANSLUMIN NON-LASER: HCPCS

## 2019-05-23 PROCEDURE — 85027 COMPLETE CBC AUTOMATED: CPT

## 2019-05-23 PROCEDURE — C1769 GUIDE WIRE: HCPCS

## 2019-05-23 PROCEDURE — C1887 CATHETER, GUIDING: HCPCS

## 2019-05-23 PROCEDURE — 99152 MOD SED SAME PHYS/QHP 5/>YRS: CPT | Performed by: INTERNAL MEDICINE

## 2019-05-23 PROCEDURE — 6360000004 HC RX CONTRAST MEDICATION: Performed by: INTERNAL MEDICINE

## 2019-05-23 PROCEDURE — C1874 STENT, COATED/COV W/DEL SYS: HCPCS

## 2019-05-23 PROCEDURE — 6370000000 HC RX 637 (ALT 250 FOR IP)

## 2019-05-23 PROCEDURE — 92928 PRQ TCAT PLMT NTRAC ST 1 LES: CPT

## 2019-05-23 PROCEDURE — 36415 COLL VENOUS BLD VENIPUNCTURE: CPT

## 2019-05-23 PROCEDURE — 93005 ELECTROCARDIOGRAM TRACING: CPT

## 2019-05-23 PROCEDURE — 99152 MOD SED SAME PHYS/QHP 5/>YRS: CPT

## 2019-05-23 PROCEDURE — 2580000003 HC RX 258: Performed by: INTERNAL MEDICINE

## 2019-05-23 PROCEDURE — 93459 L HRT ART/GRFT ANGIO: CPT | Performed by: INTERNAL MEDICINE

## 2019-05-23 RX ORDER — CLOPIDOGREL BISULFATE 75 MG/1
75 TABLET ORAL DAILY
Qty: 90 TABLET | Refills: 3 | Status: SHIPPED | OUTPATIENT
Start: 2019-05-23 | End: 2020-05-26

## 2019-05-23 RX ORDER — SODIUM CHLORIDE 9 MG/ML
INJECTION, SOLUTION INTRAVENOUS CONTINUOUS
Status: DISCONTINUED | OUTPATIENT
Start: 2019-05-23 | End: 2019-05-23 | Stop reason: HOSPADM

## 2019-05-23 RX ORDER — HYDROCODONE BITARTRATE AND ACETAMINOPHEN 5; 325 MG/1; MG/1
1 TABLET ORAL EVERY 4 HOURS PRN
Status: DISCONTINUED | OUTPATIENT
Start: 2019-05-23 | End: 2019-05-23 | Stop reason: HOSPADM

## 2019-05-23 RX ORDER — SODIUM CHLORIDE 0.9 % (FLUSH) 0.9 %
10 SYRINGE (ML) INJECTION EVERY 12 HOURS SCHEDULED
Status: DISCONTINUED | OUTPATIENT
Start: 2019-05-23 | End: 2019-05-23 | Stop reason: HOSPADM

## 2019-05-23 RX ORDER — SODIUM CHLORIDE 0.9 % (FLUSH) 0.9 %
10 SYRINGE (ML) INJECTION PRN
Status: DISCONTINUED | OUTPATIENT
Start: 2019-05-23 | End: 2019-05-23 | Stop reason: HOSPADM

## 2019-05-23 RX ORDER — HYDROCODONE BITARTRATE AND ACETAMINOPHEN 5; 325 MG/1; MG/1
2 TABLET ORAL EVERY 4 HOURS PRN
Status: DISCONTINUED | OUTPATIENT
Start: 2019-05-23 | End: 2019-05-23 | Stop reason: HOSPADM

## 2019-05-23 RX ORDER — ACETAMINOPHEN 325 MG/1
650 TABLET ORAL EVERY 4 HOURS PRN
Status: DISCONTINUED | OUTPATIENT
Start: 2019-05-23 | End: 2019-05-23 | Stop reason: HOSPADM

## 2019-05-23 RX ORDER — ATROPINE SULFATE 0.4 MG/ML
0.5 AMPUL (ML) INJECTION
Status: DISCONTINUED | OUTPATIENT
Start: 2019-05-23 | End: 2019-05-23 | Stop reason: HOSPADM

## 2019-05-23 RX ORDER — NITROGLYCERIN 0.4 MG/1
0.4 TABLET SUBLINGUAL EVERY 5 MIN PRN
Status: DISCONTINUED | OUTPATIENT
Start: 2019-05-23 | End: 2019-05-23 | Stop reason: HOSPADM

## 2019-05-23 RX ORDER — FENTANYL CITRATE 50 UG/ML
25 INJECTION, SOLUTION INTRAMUSCULAR; INTRAVENOUS
Status: DISCONTINUED | OUTPATIENT
Start: 2019-05-23 | End: 2019-05-23 | Stop reason: HOSPADM

## 2019-05-23 RX ORDER — SODIUM CHLORIDE 9 MG/ML
INJECTION, SOLUTION INTRAVENOUS CONTINUOUS
Status: DISCONTINUED | OUTPATIENT
Start: 2019-05-23 | End: 2019-05-23

## 2019-05-23 RX ADMIN — SODIUM CHLORIDE: 9 INJECTION, SOLUTION INTRAVENOUS at 07:04

## 2019-05-23 RX ADMIN — IOPAMIDOL 260 ML: 612 INJECTION, SOLUTION INTRAVENOUS at 08:58

## 2019-05-23 NOTE — PROGRESS NOTES
IV cathlon removed. Discharge instructions given. Patient voiced understanding and patient discharged home via wheelchair and family.

## 2019-05-23 NOTE — LETTER
[facility]  Cardiac Rehab Department  [address], [place]  [phone]  Toll Free [phone]        May 24, 2019    Dear Ryan Salazar,    We apologize a member of the Cardiac Rehab staff was unable to see you prior to your discharge from [facility]. Please find this informational packet that has been put together for you on heart disease and the guidelines you are to follow concerning your present cardiac condition and immediate recovery. As a result of your recent intervention your cardiologist, Dr. Alanis Hagan, highly recommends that you enroll in Phase II Outpatient Cardiac Rehab. This elective service has been shown to significantly reduce cardiac mortality by 26-31% among patients such as yourself. As a courtesy, an orientation and assessment appointment has been set aside for you on:     DAY:  Thursday  DATE:   6/6/19  TIME:   10:00 am    The Cardiac Rehab Department is conveniently located on the first floor of the hospital within The [place] [address]. A brochure has been included in this mailing to give you a brief overview of the program while details will be discussed and questions answered to your satisfaction during your orientation. We kindly ask that you please confirm or cancel this appointment no less than 24 hours prior. Furthermore, feel free to reach out to us at anytime and our staff will be more than pleased to assist you. Thank you.     To Your Health,        [facility] Cardiac Rehab Staff    Norberto Hashimoto, BS, MA  Exercise Physiologist

## 2019-05-23 NOTE — TELEPHONE ENCOUNTER
Eric Blackmon called to reschedule a OV with Lauren to Dr Bill Amaya only . Please be advised that the best time to call him to accommodate their needs is anytime. Thank you.

## 2019-05-23 NOTE — H&P
Caleb Yarbrough - : 1955     Reason For Visit:  Ellis Adams is a 75 y.o. male who is here for Follow-up (Patient complains of chest pain on exertion and SOA, off and on x 2 months.); Coronary Artery Disease; Hyperlipidemia; and Hypertension     The patient presents today for a problem visit. He report symptoms onset 2 months ago. He has been having left chest and arm pain with incline walking. At times, left jaw will ache as well. Reports associated fatigue and LICEA. Reports \"I am starting to feel like I did before my open heart surgery. \"  He did see his PCP today who ordered additional testing and NTG sl.  BP is well controlled on current regimen. The patient's PCP monitors cholesterol.       Subjective  Ellis Adams denies  orthopnea, paroxysmal nocturnal dyspnea, syncope, presyncope, sustained arrhythmia and edema. The patient denies numbness or weakness to suggest cerebrovascular accident or transient ischemic attack. + left chest pain onset 2 months ago. Associated with incline walking. Radiation to left arm and left jaw. Describes as aching. Associated LICEA and fatigue.  Symptom relief with rest.  Has not taken NTG sl for discomfort.     Caleb Yarbrough has the following history as recorded in [facility]:          Patient Active Problem List   Diagnosis Code    Hypertension I10    Chronic obstructive pulmonary disease (COPD) ([facility] [address] J44.9    Dyspnea R06.00    Chest pain R07.9    H/O: rheumatic fever Z86.79    CAD (coronary artery disease) I25.10    S/P CABG x 2 Z95.1    Fatigue R53.83    Hyperlipidemia E78.5      Past Medical History        Past Medical History:   Diagnosis Date    Arthritis       POLYARTICULAR    CAD (coronary artery disease)      CAD (coronary artery disease) 10/31/2012     S/P CABG    Chest pain      Chronic obstructive pulmonary disease (COPD) (HCC)      Depression      Dyspnea      GERD (gastroesophageal reflux disease)      H/O: rheumatic fever      Hyperlipidemia       Cholesterol management per pcp.  Hypertension           Past Surgical History         Past Surgical History:   Procedure Laterality Date    CARDIAC CATHETERIZATION   9/11/12   MDL     with aortic root injection.  CHOLECYSTECTOMY        COLONOSCOPY        CORONARY ARTERY BYPASS GRAFT   9/14/2012      PACABG X 2, LIMA-LAD, SVG-PLM, TMR, DR Nickerson Later    FRACTURE SURGERY         RT ELBOW, LT LEG AND ANKLE    UPPER GASTROINTESTINAL ENDOSCOPY             Family History         Family History   Problem Relation Age of Onset    Heart Attack Father      Heart Disease Father      Hypertension Father           Social History            Tobacco Use    Smoking status: Never Smoker    Smokeless tobacco: Current User       Types: Snuff   Substance Use Topics    Alcohol use: No      Current Facility-Administered Medications          Current Outpatient Medications   Medication Sig Dispense Refill    meloxicam (MOBIC) 15 MG tablet Take 15 mg by mouth as needed         metoprolol succinate (TOPROL XL) 50 MG extended release tablet TAKE ONE TABLET BY MOUTH ONCE DAILY 90 tablet 3    temazepam (RESTORIL) 30 MG capsule Take 30 mg by mouth nightly as needed for Sleep        HYDROcodone-acetaminophen (NORCO) 7.5-325 MG per tablet Take 1 tablet by mouth as needed Back pain        pantoprazole (PROTONIX) 40 MG tablet Take 40 mg by mouth 2 times daily         aspirin 81 MG tablet Take 81 mg by mouth daily.        mometasone-formoterol (DULERA) 100-5 MCG/ACT inhaler Inhale 2 puffs into the lungs 2 times daily.        lisinopril (PRINIVIL;ZESTRIL) 5 MG tablet Take 5 mg by mouth daily.          escitalopram (LEXAPRO) 10 MG tablet Take 10 mg by mouth daily.            No current facility-administered medications for this visit.          Allergies: Librium     Review of Systems  Constitutional - no appetite change, or unexpected weight change. No fever, chills or diaphoresis. + fatigue.   HEENT - no significant rhinorrhea without crackles, wheezes, rub or rhonchi. Cardiovascular - No jugular venous distention. Auscultation reveals regular rate and rhythm. No audible clicks, gallop or rub. No murmur. No lower extremity varicosities. No carotid bruits. Abdominal -  No visible distention, mass or pulsations. Extremities - No clubbing or cyanosis. No statis dermatitis or ulcers. No edema. Musculoskeletal -   No Osler's nodes. No kyphosis or scoliosis. Gait is even and regular without limp or shuffle. Ambulates without assistance. Skin -  Warm and dry; no rash or pallor. No new surgical wound. Neurological - No focal neurological deficits. Thought processes coherent. No apparent tremor. Oriented to person, place and time. Psychiatric -  Appropriate affect and mood.      Assessment:      Diagnosis Orders   1. Chest pain, unspecified type      2. Essential hypertension  EKG 12 lead   3. Coronary artery disease involving native coronary artery of native heart, angina presence unspecified      4. LICEA (dyspnea on exertion)      5. Fatigue, unspecified type      6. S/P CABG x 2         Data reviewed:  9/14/12 two-vessel perfusion-assisted coronary artery bypass grafting, placing the left internal mammary artery to the left anterior descending artery and saphenous vein graft to the posterior lateral marginal branch. Adjunctive transmyocardial laser revascularization completed     EKG reviewed:  NSR 65 BPM; no acute ischemic changes or ectopy. Symptoms suggestive of myocardial ischemic. Cardiac cath recommended. Patient agreeable to proceed. Procedure and risks explained. No allergy to iodinated contrast or shellfish. Not currently on metformin or oral anticoagulation therapy. Labs were completed today at PCP. Will need to obtain from Dr. Lima Keita office. Start Imdur 30 mg (1) daily. Patient has NTG sl for prn use. Advised to rest until cath this Thursday and to go to ED with worsened symptoms.   PCP follows lipids. Patient is compliant with medication regimen.         BP Readings from Last 3 Encounters:   05/20/19 130/80   11/13/18 132/80   04/18/18 138/86         Pulse Readings from Last 3 Encounters:   05/20/19 66   11/13/18 80   04/18/18 82              Wt Readings from Last 3 Encounters:   05/20/19 242 lb (109.8 kg)   11/13/18 238 lb (108 kg)   04/18/18 240 lb (108.9 kg)      Plan  Previous cardiac history and records reviewed. [place] at the [facility] and [address] [place] [address] located on the first floor of [place] [address] through hospital main entrance and turn immediately to your left. Date/Time: 5/23/19 arrive at 6:30AM for 7:30AM procedure  Pre-operative work-up:  CBC, BMP  Allergies:  Librium   Contact number:  [phone] (home)   Cardiac Catheterization Instructions   · Do not eat or drink anything after midnight (or 8 hours) prior to the procedure. Please take your morning medication with a small amount of water at your normally scheduled time, including any blood pressure pills, Plavix, and aspirin. · Coumadin (warfarin) should be stopped two days prior to this procedure. · Xarelto (ravaroxaban), Eliquis (apixaban), or Pradaxa (dabigatran) should be stopped one day prior to procedure. · Bring a list of the names and dosages of all the medications you are taking. · You must have someone to drive you home - you are not allowed to drive for 24 hours after your procedure. If an intervention is performed, you will stay overnight at the hospital.    · Further plan will depend upon the result of the cardiac catheterization. WHAT IS A CARDIAC CATHETERIZATION? This is a procedure that providers your cardiologist with detailed information regarding how your heart functions. A small catheter (long, fine tube) is inserted into an artery (a vessel that carries blood and oxygen) that leads to your heart.  While watching with x-ray equipment, small amounts of dye are injected which enables visualization of the heart arteries and the heart chambers. The pictures that your cardiologist receives from the cardiac catheterization enables him to decide on the best method of treatment for you. If for any reason you are unable to keep this appointment, please contact [facility], [phone], as soon as possible to reschedule.     Instructions:  Start Imdur (isosorbide) 30 mg (1) tab daily for chest pain. If symptoms worsen, go to the emergency room. Continue other current medications as prescribed. Continue to follow up with primary care provider for non cardiac medical problems. Call the office with any problems, questions or concerns at [phone]. Follow up as scheduled with your cardiologist.  Dr. Jocy Hutson as scheduled after cardiac catheterization. The following educational material has been included in this after visit summary for your review: Life simple 7. Cardiac catheterization.     Additional instructions:  Coronary artery disease risk factors you can control: Smoking, high blood pressure, high cholesterol, diabetes, being overweight, lack of exercise and stress. Continue heart healthy diet. Take medications as directed. If you are taking cholesterol lowering medications, it is recommended that lab work be checked annually. Always keep a current medication list. Bring your medications to every office visit.                      Risks, benefits, alternatives of cardiac catheterization/PCI discussed with the patient and full informed consent obtained.   Acceptable Mallampati score  Consent for moderate conscious sedation  ASA 3

## 2019-05-24 NOTE — CONSULTS
Patient was discharged prior to PTCA/STENT TEACHING being conducted. Cardiac Rehab education packet was sent to the patient's address on record. Handouts included were titled; \"Home Instructions Following a Cardiac Event\", \"Cardiac Home Exercise Program - Phase I\", \"Risk Factors for Heart Disease and Stroke\" and \"Cardiac Diet/Low Cholesterol\". Patient was instructed to call no less than 24 hours prior to confirm or cancel appointment to start Phase II Cardiac Rehab on 6/6/19 @ 1000.

## 2019-05-27 LAB
EKG P AXIS: 24 DEGREES
EKG P-R INTERVAL: 214 MS
EKG Q-T INTERVAL: 434 MS
EKG QRS DURATION: 124 MS
EKG QTC CALCULATION (BAZETT): 446 MS
EKG T AXIS: 26 DEGREES

## 2019-05-30 ENCOUNTER — APPOINTMENT (OUTPATIENT)
Dept: CARDIOLOGY | Facility: HOSPITAL | Age: 64
End: 2019-05-30

## 2019-07-08 DIAGNOSIS — I25.10 CORONARY ARTERY DISEASE INVOLVING NATIVE CORONARY ARTERY OF NATIVE HEART WITHOUT ANGINA PECTORIS: ICD-10-CM

## 2019-07-09 RX ORDER — METOPROLOL SUCCINATE 50 MG/1
TABLET, EXTENDED RELEASE ORAL
Qty: 90 TABLET | Refills: 3 | Status: SHIPPED | OUTPATIENT
Start: 2019-07-09

## 2019-07-16 ENCOUNTER — TELEPHONE (OUTPATIENT)
Dept: CARDIOLOGY | Age: 64
End: 2019-07-16

## 2019-08-14 ENCOUNTER — OFFICE VISIT (OUTPATIENT)
Dept: CARDIOLOGY | Age: 64
End: 2019-08-14
Payer: MEDICARE

## 2019-08-14 VITALS
WEIGHT: 230 LBS | BODY MASS INDEX: 32.2 KG/M2 | DIASTOLIC BLOOD PRESSURE: 84 MMHG | HEIGHT: 71 IN | SYSTOLIC BLOOD PRESSURE: 136 MMHG | HEART RATE: 84 BPM

## 2019-08-14 DIAGNOSIS — I10 ESSENTIAL HYPERTENSION: ICD-10-CM

## 2019-08-14 DIAGNOSIS — I25.10 CORONARY ARTERY DISEASE INVOLVING NATIVE CORONARY ARTERY OF NATIVE HEART WITHOUT ANGINA PECTORIS: Primary | ICD-10-CM

## 2019-08-14 DIAGNOSIS — E78.2 MIXED HYPERLIPIDEMIA: ICD-10-CM

## 2019-08-14 DIAGNOSIS — Z95.1 S/P CABG X 2: ICD-10-CM

## 2019-08-14 DIAGNOSIS — Z95.5 HISTORY OF CORONARY ARTERY STENT PLACEMENT: ICD-10-CM

## 2019-08-14 PROCEDURE — 99213 OFFICE O/P EST LOW 20 MIN: CPT | Performed by: NURSE PRACTITIONER

## 2019-08-14 NOTE — PROGRESS NOTES
[facility], [place]. [address], [facility] [address] [facility]  [phone] office  [phone] fax      OFFICE VISIT:  8/14/2019    Kurt Mcdonald: 1955  History:  Reason For Visit:  Mumtaz Rao is a 75 y.o. male who is here for Post-Op Check (Feeling much better )   Diagnosis Orders   1. Coronary artery disease involving native coronary artery of native heart without angina pectoris     2. S/P CABG x 2     3. Essential hypertension     4. Mixed hyperlipidemia     5. History of coronary artery stent placement      5/23/19 Successful PCI to proximal mid and distal circumflex with drug-eluting   stents by Dr. Justin Garcia     The patient presents today for cardiology follow up. The patient had cath with successful PCI to proximal mid and distal circumflex with drug-eluting by Dr. Justin Garcia on 5/23/19. The patient denies symptoms to suggest myocardial ischemia, heart failure or arrhythmia. BP is well controlled on current regimen. The patient's PCP monitors cholesterol. Catrachita Mcclure denies exertional chest pain, shortness of breath, orthopnea, paroxysmal nocturnal dyspnea, syncope, presyncope, sustained arrythmia, edema and fatigue. The patient denies numbness or weakness to suggest cerebrovascular accident or transient ischemic attack.  James Hayes has the following history as recorded in [facility]:    Patient Active Problem List   Diagnosis Code    Hypertension I10    Chronic obstructive pulmonary disease (COPD) ([facility] [address] J44.9    LICEA (dyspnea on exertion) R06.09    Chest pain R07.9    H/O: rheumatic fever Z86.79    CAD (coronary artery disease) I25.10    S/P CABG x 2 Z95.1    Fatigue R53.83    Hyperlipidemia E78.5    Crescendo angina (HCC) I20.0    History of coronary artery stent placement Z95.5     Past Medical History:   Diagnosis Date    Arthritis     POLYARTICULAR    CAD (coronary artery disease)     CAD (coronary MD Vega(Performing Physician) on  05/23/2019 09:40    Stable CV status without symptoms of overt heart failure, arrhythmia or angina. CAD medical management includes Toprol XL, Lisiniopril, Plavix, ASA and statin - reports take 2 times weekly. S/p WANDA - DAPT one year. BP well controlled. Hx of statin difficulty. Reports taking statin twice week currently. Will obtain most recent labs from PCP for review. Patient is compliant with medication regimen. BP Readings from Last 3 Encounters:   08/14/19 136/84   05/23/19 (!) 143/79   05/20/19 130/80    Pulse Readings from Last 3 Encounters:   08/14/19 84   05/23/19 71   05/20/19 66        Wt Readings from Last 3 Encounters:   08/14/19 230 lb (104.3 kg)   05/23/19 242 lb (109.8 kg)   05/20/19 242 lb (109.8 kg)     Plan  Continue current medical management. Previous cardiac history and records reviewed. Continue current medications as directed. BP goal - 120/80. If asked, keep BP log for 2 weeks and call office at [phone] to report to nurse triage. Continue to follow up with primary care provider for non cardiac medical problems. Call the office with any problems, questions or concerns at [phone]. Follow up as scheduled with your cardiologist. 2 months Dr. Daniel Maddox. Educational included in patient instructions. Heart health.      STEVENSON Jose

## 2019-08-14 NOTE — PATIENT INSTRUCTIONS
increase the amount you walk every day. Try for at least 30 minutes on most days of the week. You also may want to swim, bike, or do other activities.     · Do not smoke. If you need help quitting, talk to your doctor about stop-smoking programs and medicines. These can increase your chances of quitting for good. Quitting smoking may be the most important step you can take to protect your heart. It is never too late to quit. You will get health benefits right away.     · Limit alcohol to 2 drinks a day for men and 1 drink a day for women. Too much alcohol can cause health problems. Medicines    · Take your medicines exactly as prescribed. Call your doctor if you think you are having a problem with your medicine.     · If your doctor recommends aspirin, take the amount directed each day. Make sure you take aspirin and not another kind of pain reliever, such as acetaminophen (Tylenol). If you take ibuprofen (such as Advil or Motrin) for other problems, take aspirin at least 2 hours before taking ibuprofen. When should you call for help? Call 911 if you have symptoms of a heart attack. These may include:    · Chest pain or pressure, or a strange feeling in the chest.     · Sweating.     · Shortness of breath.     · Pain, pressure, or a strange feeling in the back, neck, jaw, or upper belly or in one or both shoulders or arms.     · Lightheadedness or sudden weakness.     · A fast or irregular heartbeat.    After you call 911, the  may tell you to chew 1 adult-strength or 2 to 4 low-dose aspirin. Wait for an ambulance. Do not try to drive yourself.   Watch closely for changes in your health, and be sure to contact your doctor if you have any problems. Where can you learn more? Go to https://Eyelationpradipeb.[employer]. org and sign in to your Appear Here account. Enter Z898 in the [employer] box to learn more about \"A Healthy Heart: Care Instructions. \"     If you do not have an account, please click on the \"Sign Up Now\" link. Current as of: July 22, 2018  Content Version: 12.1  © 2860-3160 Healthwise, Incorporated. Care instructions adapted under license by [facility] ([facility]). If you have questions about a medical condition or this instruction, always ask your healthcare professional. [address] any warranty or liability for your use of this information.

## 2020-05-26 RX ORDER — CLOPIDOGREL BISULFATE 75 MG/1
75 TABLET ORAL DAILY
Qty: 90 TABLET | Refills: 3 | Status: SHIPPED | OUTPATIENT
Start: 2020-05-26

## 2020-07-07 ENCOUNTER — HOSPITAL ENCOUNTER (OUTPATIENT)
Dept: GENERAL RADIOLOGY | Facility: HOSPITAL | Age: 65
Discharge: HOME | End: 2020-07-07
Admitting: NURSE PRACTITIONER

## 2020-07-07 ENCOUNTER — TRANSCRIBE ORDERS (OUTPATIENT)
Dept: GENERAL RADIOLOGY | Facility: HOSPITAL | Age: 65
End: 2020-07-07

## 2020-07-07 DIAGNOSIS — M79.672 LEFT FOOT PAIN: ICD-10-CM

## 2020-07-07 DIAGNOSIS — M79.672 LEFT FOOT PAIN: Primary | ICD-10-CM

## 2020-07-07 PROCEDURE — 73630 X-RAY EXAM OF FOOT: CPT

## 2020-11-27 ENCOUNTER — APPOINTMENT (OUTPATIENT)
Dept: GENERAL RADIOLOGY | Age: 65
End: 2020-11-27
Payer: MEDICARE

## 2020-11-27 ENCOUNTER — HOSPITAL ENCOUNTER (EMERGENCY)
Age: 65
Discharge: HOME | End: 2020-11-27
Attending: EMERGENCY MEDICINE
Payer: MEDICARE

## 2020-11-27 VITALS
DIASTOLIC BLOOD PRESSURE: 81 MMHG | RESPIRATION RATE: 18 BRPM | SYSTOLIC BLOOD PRESSURE: 127 MMHG | TEMPERATURE: 96.4 F | HEIGHT: 68 IN | WEIGHT: 225 LBS | HEART RATE: 74 BPM | OXYGEN SATURATION: 95 % | BODY MASS INDEX: 34.1 KG/M2

## 2020-11-27 LAB
ADENOVIRUS BY PCR: NOT DETECTED
ALBUMIN SERPL-MCNC: 3.7 G/DL (ref 3.5–5.2)
ALP BLD-CCNC: 60 U/L (ref 40–130)
ALT SERPL-CCNC: 15 U/L (ref 5–41)
ANION GAP SERPL CALCULATED.3IONS-SCNC: 11 MMOL/L (ref 7–19)
AST SERPL-CCNC: 21 U/L (ref 5–40)
BASOPHILS ABSOLUTE: 0 K/UL (ref 0–0.2)
BASOPHILS RELATIVE PERCENT: 0.3 % (ref 0–1)
BILIRUB SERPL-MCNC: 0.8 MG/DL (ref 0.2–1.2)
BORDETELLA PARAPERTUSSIS BY PCR: NOT DETECTED
BORDETELLA PERTUSSIS BY PCR: NOT DETECTED
BUN BLDV-MCNC: 18 MG/DL (ref 8–23)
C-REACTIVE PROTEIN: 5.77 MG/DL (ref 0–0.5)
CALCIUM SERPL-MCNC: 8.5 MG/DL (ref 8.8–10.2)
CHLAMYDOPHILIA PNEUMONIAE BY PCR: NOT DETECTED
CHLORIDE BLD-SCNC: 102 MMOL/L (ref 98–111)
CO2: 24 MMOL/L (ref 22–29)
CORONAVIRUS 229E BY PCR: NOT DETECTED
CORONAVIRUS HKU1 BY PCR: NOT DETECTED
CORONAVIRUS NL63 BY PCR: NOT DETECTED
CORONAVIRUS OC43 BY PCR: NOT DETECTED
CREAT SERPL-MCNC: 0.9 MG/DL (ref 0.5–1.2)
EOSINOPHILS ABSOLUTE: 0 K/UL (ref 0–0.6)
EOSINOPHILS RELATIVE PERCENT: 0.3 % (ref 0–5)
FERRITIN: 860 NG/ML (ref 30–400)
FIBRINOGEN: 568 MG/DL (ref 238–505)
GFR AFRICAN AMERICAN: >59
GFR NON-AFRICAN AMERICAN: >60
GLUCOSE BLD-MCNC: 102 MG/DL (ref 74–109)
HCT VFR BLD CALC: 44.5 % (ref 42–52)
HEMOGLOBIN: 15.1 G/DL (ref 14–18)
HUMAN METAPNEUMOVIRUS BY PCR: NOT DETECTED
HUMAN RHINOVIRUS/ENTEROVIRUS BY PCR: NOT DETECTED
IMMATURE GRANULOCYTES #: 0 K/UL
INFLUENZA A BY PCR: NOT DETECTED
INFLUENZA B BY PCR: NOT DETECTED
INR BLD: 0.99 (ref 0.88–1.18)
LACTATE DEHYDROGENASE: 218 U/L (ref 91–215)
LIPASE: 45 U/L (ref 13–60)
LYMPHOCYTES ABSOLUTE: 0.6 K/UL (ref 1.1–4.5)
LYMPHOCYTES RELATIVE PERCENT: 16.1 % (ref 20–40)
MCH RBC QN AUTO: 29.7 PG (ref 27–31)
MCHC RBC AUTO-ENTMCNC: 33.9 G/DL (ref 33–37)
MCV RBC AUTO: 87.6 FL (ref 80–94)
MONOCYTES ABSOLUTE: 0.3 K/UL (ref 0–0.9)
MONOCYTES RELATIVE PERCENT: 7.2 % (ref 0–10)
MYCOPLASMA PNEUMONIAE BY PCR: NOT DETECTED
NEUTROPHILS ABSOLUTE: 2.8 K/UL (ref 1.5–7.5)
NEUTROPHILS RELATIVE PERCENT: 75.3 % (ref 50–65)
PARAINFLUENZA VIRUS 1 BY PCR: NOT DETECTED
PARAINFLUENZA VIRUS 2 BY PCR: NOT DETECTED
PARAINFLUENZA VIRUS 3 BY PCR: NOT DETECTED
PARAINFLUENZA VIRUS 4 BY PCR: NOT DETECTED
PDW BLD-RTO: 13.2 % (ref 11.5–14.5)
PLATELET # BLD: 130 K/UL (ref 130–400)
PMV BLD AUTO: 9.1 FL (ref 9.4–12.4)
POTASSIUM SERPL-SCNC: 3.6 MMOL/L (ref 3.5–5)
PRO-BNP: 109 PG/ML (ref 0–900)
PROCALCITONIN: 0.06 NG/ML (ref 0–0.09)
PROTHROMBIN TIME: 13 SEC (ref 12–14.6)
RBC # BLD: 5.08 M/UL (ref 4.7–6.1)
RESPIRATORY SYNCYTIAL VIRUS BY PCR: NOT DETECTED
SARS-COV-2, PCR: DETECTED
SODIUM BLD-SCNC: 137 MMOL/L (ref 136–145)
TOTAL PROTEIN: 7.1 G/DL (ref 6.6–8.7)
TROPONIN: <0.01 NG/ML (ref 0–0.03)
WBC # BLD: 3.7 K/UL (ref 4.8–10.8)

## 2020-11-27 PROCEDURE — 83690 ASSAY OF LIPASE: CPT

## 2020-11-27 PROCEDURE — 84484 ASSAY OF TROPONIN QUANT: CPT

## 2020-11-27 PROCEDURE — 99282 EMERGENCY DEPT VISIT SF MDM: CPT

## 2020-11-27 PROCEDURE — 85610 PROTHROMBIN TIME: CPT

## 2020-11-27 PROCEDURE — 0202U NFCT DS 22 TRGT SARS-COV-2: CPT

## 2020-11-27 PROCEDURE — 85025 COMPLETE CBC W/AUTO DIFF WBC: CPT

## 2020-11-27 PROCEDURE — 83615 LACTATE (LD) (LDH) ENZYME: CPT

## 2020-11-27 PROCEDURE — 85384 FIBRINOGEN ACTIVITY: CPT

## 2020-11-27 PROCEDURE — 71045 X-RAY EXAM CHEST 1 VIEW: CPT

## 2020-11-27 PROCEDURE — 83880 ASSAY OF NATRIURETIC PEPTIDE: CPT

## 2020-11-27 PROCEDURE — 80053 COMPREHEN METABOLIC PANEL: CPT

## 2020-11-27 PROCEDURE — 84145 PROCALCITONIN (PCT): CPT

## 2020-11-27 PROCEDURE — 86140 C-REACTIVE PROTEIN: CPT

## 2020-11-27 PROCEDURE — 36415 COLL VENOUS BLD VENIPUNCTURE: CPT

## 2020-11-27 PROCEDURE — 82728 ASSAY OF FERRITIN: CPT

## 2020-11-27 ASSESSMENT — ENCOUNTER SYMPTOMS
ABDOMINAL PAIN: 0
BACK PAIN: 0

## 2020-11-27 NOTE — ED NOTES
Entered room to administer Bam patient was upset and dissatisfied saying that it took to long to get medicine and that he has not had a drink of water, declined Deandre asked to be DCd.  IV removed, Dr Maynor Gustafson notified DC initiated.  Deandre sent back to pharmacy     Dave Rhoades RN  11/27/20 6768

## 2020-11-27 NOTE — ED PROVIDER NOTES
[facility]  eMERGENCY dEPARTMENT eNCOUnter      Pt Name: Amy Price  MRN: [id number]  [place] 1955  Date of evaluation: 11/27/2020  Provider: Ny Jackson MD    CHIEF COMPLAINT       Chief Complaint   Patient presents with    Chest Pain     presents with chest pains and increased SOA, tested neg for covid last week but wife was +         HISTORY OF PRESENT ILLNESS   (Location/Symptom, Timing/Onset,Context/Setting, Quality, Duration, Modifying Factors, Severity)  Note limiting factors. Amy Price is a 77 y.o. male who presents to the emergency department with concern for Covid. The patient states he has had fevers starting 5 days ago he felt sweaty it knocks out with Tylenol. He denies any overt cardiac like chest pain or shortness of breath. He states he feels pretty good today is not short of breath. Patient denies any fevers today. His family negative and coming to the hospital today. The patient has a history of a CABG remotely. He also has a history of COPD. He states he is not short of breath is not using oxygen he is sitting up comfortably saturating 96% on arrival to the ER. He appears in no distress. The patient denies any cardiac-like chest pain he has no pressure. He has no diaphoresis except for when he has a fever he states he then takes some Tylenol and it goes away. He started feeling better over the last 48 hours but he did get sick about 5 days ago. His wife was diagnosed with Covid last weekend. He seems to have come down with it a couple days later he has not had a positive test at this point. He does have a history of hypertension and hyperlipidemia per his chart. The history is provided by the patient and medical records. NursingNotes were reviewed. REVIEW OF SYSTEMS    (2-9 systems for level 4, 10 or more for level 5)     Review of Systems   Constitutional: Positive for fever. Respiratory: Cough: mild improving.     Cardiovascular: Negative for chest pain. Gastrointestinal: Negative for abdominal pain. Genitourinary: Negative for dysuria. Musculoskeletal: Negative for back pain. Skin: Negative for rash and wound. Neurological: Negative for seizures, syncope and headaches. Psychiatric/Behavioral: Negative for confusion. A complete review of systems was performed and is negative except as noted above in the HPI. PAST MEDICAL HISTORY     Past Medical History:   Diagnosis Date    Arthritis     POLYARTICULAR    CAD (coronary artery disease)     CAD (coronary artery disease) 10/31/2012    S/P CABG    Chest pain     Chronic obstructive pulmonary disease (COPD) (HCC)     Depression     Dyspnea     GERD (gastroesophageal reflux disease)     H/O: rheumatic fever     Hyperlipidemia     Cholesterol management per pcp.  Hypertension          SURGICAL HISTORY       Past Surgical History:   Procedure Laterality Date    CARDIAC CATHETERIZATION  9/11/12   MDL    with aortic root injection.  CHOLECYSTECTOMY      COLONOSCOPY      CORONARY ARTERY BYPASS GRAFT  9/14/2012     PACABG X 2, LIMA-LAD, SVG-PLM, TMR, DR CHOU    FRACTURE SURGERY      RT ELBOW, LT LEG AND ANKLE    UPPER GASTROINTESTINAL ENDOSCOPY           CURRENT MEDICATIONS       Previous Medications    ASPIRIN 81 MG TABLET    Take 81 mg by mouth daily. CLOPIDOGREL (PLAVIX) 75 MG TABLET    TAKE 1 TABLET BY MOUTH DAILY    ESCITALOPRAM (LEXAPRO) 10 MG TABLET    Take 10 mg by mouth daily. HYDROCODONE-ACETAMINOPHEN (NORCO) 7.5-325 MG PER TABLET    Take 1 tablet by mouth as needed Back pain    ISOSORBIDE MONONITRATE (IMDUR) 30 MG EXTENDED RELEASE TABLET    Take 1 tablet by mouth daily    LISINOPRIL (PRINIVIL;ZESTRIL) 5 MG TABLET    Take 5 mg by mouth daily.       MELOXICAM (MOBIC) 15 MG TABLET    Take 15 mg by mouth as needed     METOPROLOL SUCCINATE (TOPROL XL) 50 MG EXTENDED RELEASE TABLET    take 1 tablet by mouth daily    MOMETASONE-FORMOTEROL (DULERA) 100-5 MCG/ACT INHALER    Inhale 2 puffs into the lungs 2 times daily. PANTOPRAZOLE (PROTONIX) 40 MG TABLET    Take 40 mg by mouth 2 times daily     TEMAZEPAM (RESTORIL) 30 MG CAPSULE    Take 30 mg by mouth nightly as needed for Sleep       ALLERGIES     Librium    FAMILY HISTORY       Family History   Problem Relation Age of Onset    Heart Attack Father     Heart Disease Father     Hypertension Father           SOCIAL HISTORY       Social History     Socioeconomic History    Marital status:      Spouse name: Enid Ragsdale Number of children: 2    Years of education: None    Highest education level: None   Occupational History    None   Social Needs    Financial resource strain: None    Food insecurity     Worry: None     Inability: None    Transportation needs     Medical: None     Non-medical: None   Tobacco Use    Smoking status: Never Smoker    Smokeless tobacco: Current User     Types: Snuff   Substance and Sexual Activity    Alcohol use: No    Drug use: No    Sexual activity: None     Comment: .    Lifestyle    Physical activity     Days per week: None     Minutes per session: None    Stress: None   Relationships    Social connections     Talks on phone: None     Gets together: None     Attends Scientology service: None     Active member of club or organization: None     Attends meetings of clubs or organizations: None     Relationship status: None    Intimate partner violence     Fear of current or ex partner: None     Emotionally abused: None     Physically abused: None     Forced sexual activity: None   Other Topics Concern    None   Social History Narrative    None       SCREENINGS             PHYSICAL EXAM    (up to 7 for level 4, 8 or more for level 5)     ED Triage Vitals [11/27/20 1038]   BP Temp Temp src Pulse Resp SpO2 Height Weight   (!) 142/76 96.4 °F (35.8 °C) -- 81 12 96 % 5' 8\" (1.727 m) 225 lb (102.1 kg)       Physical Exam  Vitals signs and nursing note reviewed. Constitutional:       Appearance: Normal appearance. HENT:      Head: Normocephalic and atraumatic. Mouth/Throat:      Mouth: Mucous membranes are moist.   Eyes:      Extraocular Movements: Extraocular movements intact. Pupils: Pupils are equal, round, and reactive to light. Neck:      Musculoskeletal: Normal range of motion and neck supple. Cardiovascular:      Rate and Rhythm: Regular rhythm. Tachycardia present. Pulses: Normal pulses. Pulmonary:      Effort: Pulmonary effort is normal. No respiratory distress. Breath sounds: Normal breath sounds. Comments: Large well-healed midline scar from Cabg remote  Abdominal:      General: Abdomen is flat. There is no distension. Palpations: Abdomen is soft. Tenderness: There is no abdominal tenderness. Musculoskeletal: Normal range of motion. General: No swelling or tenderness. Skin:     General: Skin is warm and dry. Coloration: Skin is not jaundiced. Neurological:      General: No focal deficit present. Mental Status: He is alert and oriented to person, place, and time. Psychiatric:         Mood and Affect: Mood normal.         Behavior: Behavior normal.         Thought Content: Thought content normal.         Judgment: Judgment normal.         DIAGNOSTIC RESULTS     EKG: All EKG's are interpreted by the Emergency Department Physician who either signs or Co-signs this chart in the absence of a cardiologist.    EKG sinus 63 . Likely LVH. EKG appears similar with the same morphology to 2019 EKG. RADIOLOGY:   Non-plain film images such as CT, Ultrasound and MRI are read by the radiologist. Magdaline Fujita images are visualized and preliminarily interpreted by the emergency physician with the below findings:        Interpretation per the Radiologist below, if available at the time of this note:    XR CHEST PORTABLE   Final Result   No acute findings.    Signed by Dr Lyndon Mcgrath Mounika Yeh on 11/27/2020 11:59 AM            ED BEDSIDE ULTRASOUND:   Performed by ED Physician - none    LABS:  Labs Reviewed   RESPIRATORY PANEL, MOLECULAR, WITH COVID-19 - Abnormal; Notable for the following components:       Result Value    SARS-CoV-2, PCR DETECTED (*)     All other components within normal limits    Narrative:     Marcela Guzmán tel. ,  Results called to, 11/27/2020 15:01, by [facility]  Results called to [address] [place] [address] ER, 11/27/2020 15:00, by Teresa Parkinson - Abnormal; Notable for the following components:    Calcium 8.5 (*)     All other components within normal limits   CBC WITH AUTO DIFFERENTIAL - Abnormal; Notable for the following components:    WBC 3.7 (*)     MPV 9.1 (*)     Neutrophils % 75.3 (*)     Lymphocytes % 16.1 (*)     Lymphocytes Absolute 0.6 (*)     All other components within normal limits   C-REACTIVE PROTEIN - Abnormal; Notable for the following components:    CRP 5.77 (*)     All other components within normal limits   LACTATE DEHYDROGENASE - Abnormal; Notable for the following components:     (*)     All other components within normal limits   FERRITIN - Abnormal; Notable for the following components:    Ferritin 860.0 (*)     All other components within normal limits   FIBRINOGEN - Abnormal; Notable for the following components:    Fibrinogen 568 (*)     All other components within normal limits   LIPASE   PROTIME-INR   TROPONIN   BRAIN NATRIURETIC PEPTIDE   PROCALCITONIN   URINE RT REFLEX TO CULTURE       All other labs were within normal range or not returned as of this dictation.     EMERGENCY DEPARTMENT COURSE and DIFFERENTIALDIAGNOSIS/MDM:   Vitals:    Vitals:    11/27/20 1038 11/27/20 1100 11/27/20 1130   BP: (!) 142/76 123/76 127/81   Pulse: 81 68 74   Resp: 12 17 18   Temp: 96.4 °F (35.8 °C)     SpO2: 96% 95% 95%   Weight: 225 lb (102.1 kg)     Height: 5' 8\" (1.727 m)         MDM  Number of Diagnoses or Management Options  Chronic obstructive pulmonary disease, unspecified COPD type ([facility]):   COVID-19 virus infection:   Hx of CABG:   Obesity, unspecified classification, unspecified obesity type, unspecified whether serious comorbidity present:   Diagnosis management comments: Symptom onset 11/22/2020. Covid test result date 11/27/2020 today in the ER. Patient is 77years old with a BMI of 34 he has a history of CABG and hypertension. The patient has a history of some COPD he tells me. He is high risk for Covid progression of disease. At this time the patient does not meet criteria for admission. He is high risk for worsening. Therefore I have offered him Bamlanivimab. As he meets criteria. Patient has had the opportunity to ask questions and have discussion about receiving bamlanivimab. Patient was given the Fact Sheet for Patients, Parents and Caregivers. Patient was informed of alternatives to receiving authorized bamlanivimab, and  Informed that bamlanivimab is an unapproved drug that is authorized for use under this Emergency Use Authorization. The patient qualifies under the current criteria set forth to receive the drug and has given verbal consent also signed written consent to receive bamlanivimab.       3:09 PM  Pt spoke with his wife and elects the drug under eua. Questions answered by me. Papers all given. Pharmacy placing order for me. My shift has already ended, Dr Minerva Greenberg to make sure no reaction and stable for dc post infusion. Amount and/or Complexity of Data Reviewed  Clinical lab tests: reviewed and ordered  Tests in the radiology section of CPT®: ordered and reviewed  Independent visualization of images, tracings, or specimens: yes    Patient Progress  Patient progress: stable        CONSULTS:  None    PROCEDURES:  Unless otherwise notedbelow, none     Procedures    FINAL IMPRESSION     1. COVID-19 virus infection    2.  Hx of CABG    3. Obesity, unspecified classification, unspecified obesity type, unspecified whether serious comorbidity present    4. Chronic obstructive pulmonary disease, unspecified COPD type ([facility] [address]          DISPOSITION/PLAN   DISPOSITION        PATIENT REFERRED TO:  No follow-up provider specified.     DISCHARGE MEDICATIONS:  New Prescriptions    No medications on file          (Please note that portions of this note were completed with a voice recognition program.  Efforts were made to edit the dictations butoccasionally words are mis-transcribed.)    Piyush Oliver MD (electronically signed)  AttendingEmergency Physician         Piyush Oliver MD  11/27/20 0513

## 2020-11-28 ENCOUNTER — CARE COORDINATION (OUTPATIENT)
Dept: CARE COORDINATION | Age: 65
End: 2020-11-28

## 2020-11-28 NOTE — CARE COORDINATION
ACM attempted to call patient today for CT follow up re: ED visit 11/27. I left a voicemail this morning and again this afternoon. Also called pt's wife's home number - she is listed on his HIPAA form. During call to wife's #, I received a text that stated, \"Sorry, I can't talk right now. \" Unable to leave a voicemail regarding the purpose of my call. If patient should call back, respond to my voicemail messages - I will complete CT protocol for COVID transitions.   Electronically signed by Michel Ogden RN on 11/28/2020 at 3:36 PM

## 2020-11-30 ENCOUNTER — EPISODE CHANGES (OUTPATIENT)
Dept: CASE MANAGEMENT | Facility: OTHER | Age: 65
End: 2020-11-30

## 2020-12-01 LAB
EKG P AXIS: 33 DEGREES
EKG P-R INTERVAL: 192 MS
EKG Q-T INTERVAL: 440 MS
EKG QRS DURATION: 132 MS
EKG QTC CALCULATION (BAZETT): 443 MS
EKG T AXIS: -7 DEGREES

## 2021-04-12 ENCOUNTER — TRANSCRIBE ORDERS (OUTPATIENT)
Dept: ADMINISTRATIVE | Facility: HOSPITAL | Age: 66
End: 2021-04-12

## 2021-04-12 ENCOUNTER — HOSPITAL ENCOUNTER (OUTPATIENT)
Dept: GENERAL RADIOLOGY | Facility: HOSPITAL | Age: 66
Discharge: HOME | End: 2021-04-12
Admitting: NURSE PRACTITIONER

## 2021-04-12 DIAGNOSIS — M54.6 PAIN IN THORACIC SPINE: ICD-10-CM

## 2021-04-12 DIAGNOSIS — R07.89 OTHER CHEST PAIN: Primary | ICD-10-CM

## 2021-04-12 PROCEDURE — 72072 X-RAY EXAM THORAC SPINE 3VWS: CPT

## 2021-04-12 PROCEDURE — 71101 X-RAY EXAM UNILAT RIBS/CHEST: CPT

## 2021-05-26 ENCOUNTER — TELEPHONE (OUTPATIENT)
Dept: CARDIOLOGY CLINIC | Age: 66
End: 2021-05-26

## 2021-05-26 ENCOUNTER — OFFICE VISIT (OUTPATIENT)
Dept: CARDIOLOGY CLINIC | Age: 66
End: 2021-05-26
Payer: MEDICARE

## 2021-05-26 VITALS
HEART RATE: 68 BPM | BODY MASS INDEX: 32.5 KG/M2 | HEIGHT: 70 IN | DIASTOLIC BLOOD PRESSURE: 80 MMHG | OXYGEN SATURATION: 99 % | WEIGHT: 227 LBS | SYSTOLIC BLOOD PRESSURE: 136 MMHG

## 2021-05-26 DIAGNOSIS — I10 ESSENTIAL HYPERTENSION: ICD-10-CM

## 2021-05-26 DIAGNOSIS — E78.2 MIXED HYPERLIPIDEMIA: ICD-10-CM

## 2021-05-26 DIAGNOSIS — Z95.1 S/P CABG X 2: ICD-10-CM

## 2021-05-26 DIAGNOSIS — Z95.5 HISTORY OF CORONARY ARTERY STENT PLACEMENT: ICD-10-CM

## 2021-05-26 DIAGNOSIS — I25.10 CORONARY ARTERY DISEASE INVOLVING NATIVE CORONARY ARTERY OF NATIVE HEART WITHOUT ANGINA PECTORIS: Primary | ICD-10-CM

## 2021-05-26 PROCEDURE — 99214 OFFICE O/P EST MOD 30 MIN: CPT | Performed by: NURSE PRACTITIONER

## 2021-05-26 NOTE — PROGRESS NOTES
[facility], [place]. [address] [facility]  [phone] office  [phone] fax      OFFICE VISIT:  5/26/2021    Kurt Mcdonald: 1955  Reason For Visit:  Claude Fritter is a 74 y.o. male who is here for Follow-up (Patient hasnt had an appointment in 2 years. Just getting checked. ) and Coronary Artery Disease    History:   Diagnosis Orders   1. Coronary artery disease involving native coronary artery of native heart without angina pectoris     2. S/P CABG x 2     3. Essential hypertension     4. History of coronary artery stent placement      Successful PCI to proximal mid and distal circumflex with drug-eluting   stents. 5. Mixed hyperlipidemia       The patient presents today for cardiology follow up. Mr. Jo Bustillos reports feeling great with no cardiac related issues. He is still undecided about proceeding with coronavirus vaccinations. He reports having the virus this pas November. The patient denies symptoms to suggest myocardial ischemia, heart failure or arrhythmia. BP is well controlled on current regimen. The patient's PCP monitors cholesterol. Jolinda Reason denies exertional chest pain, shortness of breath, orthopnea, paroxysmal nocturnal dyspnea, syncope, presyncope, sensed arrhythmia, edema and fatigue. The patient denies numbness or weakness to suggest cerebrovascular accident or transient ischemic attack.      Negar Cormier has the following history as recorded in [facility]:  Patient Active Problem List   Diagnosis Code    Hypertension I10    Chronic obstructive pulmonary disease (COPD) ([facility] [address] J44.9    LICEA (dyspnea on exertion) R06.00    Chest pain R07.9    H/O: rheumatic fever Z86.79    CAD (coronary artery disease) I25.10    S/P CABG x 2 Z95.1    Fatigue R53.83    Hyperlipidemia E78.5    Crescendo angina (HCC) I20.0    History of coronary artery stent placement Z95.5     Past Medical tablet Take 5 mg by mouth daily.  escitalopram (LEXAPRO) 10 MG tablet Take 10 mg by mouth daily. No current facility-administered medications for this visit. Allergies: Librium    Review of Systems  Constitutional - no appetite change, or unexpected weight change. No fever, chills or diaphoresis. No significant change in activity level or new onset of fatigue. HEENT - no significant rhinorrhea or epistaxis. No tinnitus or significant hearing loss. Eyes - no sudden vision change or amaurosis. No corneal arcus, xantholasma, subconjunctival hemorrhage or discharge. Respiratory - no significant wheezing, stridor, apnea or cough. No dyspnea on exertion or shortness of air. Cardiovascular - no exertional chest pain to suggest myocardial ischemia. No orthopnea or PND. No sensation of sustained arrythmia. No occurrence of slow heart rate. No palpitations. No claudication. Gastrointestinal - no abdominal swelling or pain. No blood in stool. No severe constipation, diarrhea, nausea, or vomiting. Genitourinary - no dysuria, frequency, or urgency. No flank pain or hematuria. Musculoskeletal - no back pain or myalgia. No problems with gait. Extremities - no clubbing, cyanosis or extremity edema. Skin - no color change or rash. No pallor. No new surgical incision. Neurologic - no speech difficulty, facial asymmetry or lateralizing weakness. No seizures, presyncope or syncope. No significant dizziness. Hematologic - no easy bruising or excessive bleeding. Psychiatric - no severe anxiety or insomnia. No confusion. All other review of systems are negative. Objective  Vital Signs - /80   Pulse 68   Ht 5' 10\" (1.778 m)   Wt 227 lb (103 kg)   SpO2 99%   BMI 32.57 kg/m²   General - Shanice Dejesus is alert, cooperative, and pleasant. Well groomed. No acute distress. Body habitus - Body mass index is 32.57 kg/m². HEENT - Head is normocephalic. No circumoral cyanosis. Dentition is normal.  EYES -   Lids normal without ptosis. No discharge, edema or subconjunctival hemorrhage. Neck - Symmetrical without apparent mass or lymphadenopathy. Respiratory - Normal respiratory effort without use of accessory muscles. Ausculatation reveals vesicular breath sounds without crackles, wheezes, rub or rhonchi. Cardiovascular - No jugular venous distention. Auscultation reveals regular rate and rhythm. No audible clicks, gallop or rub. No murmur. No lower extremity varicosities. No carotid bruits. Abdominal -  No visible distention, mass or pulsations. Extremities - No clubbing or cyanosis. No statis dermatitis or ulcers. No edema. Musculoskeletal -   No Osler's nodes. No kyphosis or scoliosis. Gait is even and regular without limp or shuffle. Ambulates without assistance. Skin -  Warm and dry; no rash or pallor. No new surgical wound. Neurological - No focal neurological deficits. Thought processes coherent. No apparent tremor. Oriented to person, place and time. Psychiatric -  Appropriate affect and mood. Data reviewed:  5/23/19 cath - Dr. Rashaad Coyle disease with severe dominant circumflex proximal mid and  distal lesions, occluded ostial LAD and occluded nondominant RCA. Patent LIMA to mid LAD. Patent SVG to OM 2 that fails to back fill the rest of circumflex. Borderline normal LV ejection fraction with anterolateral wall motion   abnormality   Successful PCI to proximal mid and distal circumflex with drug-eluting   stents.     Recommendations    Medical management    Signatures    Electronically signed by Jose Ferrari MD(Performing Physician) on   05/23/2019 09:40    Lab Results   Component Value Date    WBC 3.7 (L) 11/27/2020    HGB 15.1 11/27/2020    HCT 44.5 11/27/2020    MCV 87.6 11/27/2020     11/27/2020     Lab Results   Component Value Date     11/27/2020    K 3.6 11/27/2020     11/27/2020    CO2 24 11/27/2020 BUN 18 11/27/2020    CREATININE 0.9 11/27/2020    GLUCOSE 102 11/27/2020    CALCIUM 8.5 (L) 11/27/2020    PROT 7.1 11/27/2020    LABALBU 3.7 11/27/2020    BILITOT 0.8 11/27/2020    ALKPHOS 60 11/27/2020    AST 21 11/27/2020    ALT 15 11/27/2020    LABGLOM >60 11/27/2020    GFRAA >59 11/27/2020    GLOB 2.3 03/14/2017       Lab Results   Component Value Date    CHOL 211 12/10/2012    CHOL 188 09/13/2012    CHOL 198 09/12/2012     Lab Results   Component Value Date    TRIG 294 (H) 12/10/2012    TRIG 205 (H) 09/13/2012    TRIG 240 (H) 09/12/2012     Lab Results   Component Value Date    HDL 31 12/10/2012    HDL 32 09/13/2012    HDL 32 09/12/2012     Lab Results   Component Value Date    LDLDIRECT 147 12/10/2012       BP Readings from Last 3 Encounters:   05/26/21 136/80   11/27/20 127/81   08/14/19 136/84    Pulse Readings from Last 3 Encounters:   05/26/21 68   11/27/20 74   08/14/19 84        Wt Readings from Last 3 Encounters:   05/26/21 227 lb (103 kg)   11/27/20 225 lb (102.1 kg)   08/14/19 230 lb (104.3 kg)     Assessment/Plan:   Diagnosis Orders   1. Coronary artery disease involving native coronary artery of native heart without angina pectoris     2. S/P CABG x 2     3. Essential hypertension     4. History of coronary artery stent placement      Successful PCI to proximal mid and distal circumflex with drug-eluting   stents. 5. Mixed hyperlipidemia       Stable CV status without overt heart failure, sensed arrhythmia or angina. CAD s/p CABG and stenting - stable on current medical management. Continue [email or web address]  HTN - normotensive on current regimen. Continue same. Hyperlipidemia -  . Hx statin difficulty. At one point, patient was taking statin twice weekly. No statin on list currently. Plan to call patient regarding statin therapy. Patient is compliant with medication regimen. Previous cardiac history and records reviewed.   Continue current medical management for cardiac related condition. Continue other current medications as directed. Continue to follow up with primary care provider for non cardiac medical problems. If your primary care provider is outside of the [facility], please request that your labs be faxed to this office at [phone]. BP goal 130/80 or less. Call the office with any problems, questions or concerns at [phone]. Cardiology follow up as scheduled in [address] appointments. 6 months Dr. Gonzalo Roberts. Educational included in patient instructions. Heart health.      Osman Jurado, APRN

## 2021-05-26 NOTE — PATIENT INSTRUCTIONS
New instructions for today:   How to take:  NITROGLYCERIN (Nitrostat) 0.4 mg tablets, sublingual.  Nitroglycerin is in a group of drugs called nitrates. Nitroglycerin dilates (widens) blood vessels, making it easier for blood to flow through them and easier for the heart to pump. Dosing Guidelines for Nitroglycerin Tablets  · At the start of an angina (chest pain) attack, place one tablet under the tongue or between the cheek and gum. Do not swallow or chew the tablet; let it dissolve on its own. If necessary, a second and third tablet may be used, with five minutes between using each tablet. If you use a third tablet and your chest pain continues, it is time to seek immediate medical attention. Call 911 immediately and have someone drive you to the emergency room. You may be having a heart attack or other serious heart problem. · To prevent angina from exercise or stress, use 1 tablet 5 to 10 minutes before the activity. Patient Instructions:  Continue current medications as prescribed. Always keep a current medication list. Bring your medications to every office visit. Continue to follow up with primary care provider for non cardiac medical problems. Call the office with any problems, questions or concerns at [phone]. If you have been asked to keep a blood pressure log, do so for 2 weeks. Call the office to report readings to the triage nurse at [phone]. Follow up with cardiologist as scheduled. The following educational material has been included in this after visit summary for your review: Life simple 7. Heart health. Life simple 7  1) Manage blood pressure - high blood pressure is a major risk factor for heart disease and stroke. Keeping blood pressure in health range reduces strain on your heart, arteries and kidneys. Blood pressure goal is less than 130/80. 2) Control cholesterol - contributes to plaque, which can clog arteries and lead to heart disease and stroke.  When you control your cholesterol you are giving your arteries their best chance to remain clear. It is recommended that you get cholesterol lab work done once a year. 3) Reduce blood sugar - most of the food we eat is turning into glucose or blood sugar that our body uses for energy. Over time, high levels of blood sugar can damage your heart, kidneys, eyes and nerves. 4) Get active - living an active life is one of the most rewarding gifts you can give yourself and those you love. Simply put, daily physical activity increases your length and quality of life. Strive to exercise 15 minutes most days of the week. 5)  Eat better - A healthy diet is one of your best weapons for fighting cardiovascular disease. When you eat a heart healthy diet, you improve your chances for feeling good and staying healthy for life. 6)  Lose weight - when you shed extra fat an unnecessary pounds, you reduce the burden on your hear, lungs, blood vessels and skeleton. You give yourself the gift of active living, you lower your blood pressure and help yourself feel better. 7) Stop smoking - cigarette smokers have a higher risk of developing cardiovascular disease. If  You smoke, quitting is the best thing you can do for your health. Check American Heart Association on line for more information on Life's Simple 7 and tips for healthy living. A Healthy Heart: Care Instructions  Your Care Instructions     Coronary artery disease, also called heart disease, occurs when a substance called plaque builds up in the vessels that supply oxygen-rich blood to your heart muscle. This can narrow the blood vessels and reduce blood flow. A heart attack happens when blood flow is completely blocked. A high-fat diet, smoking, and other factors increase the risk of heart disease. Your doctor has found that you have a chance of having heart disease. You can do lots of things to keep your heart healthy.  It may not be easy, but you can change your diet, exercise more, and quit smoking. These steps really work to lower your chance of heart disease. Follow-up care is a key part of your treatment and safety. Be sure to make and go to all appointments, and call your doctor if you are having problems. It's also a good idea to know your test results and keep a list of the medicines you take. How can you care for yourself at home? Diet  · Use less salt when you cook and eat. This helps lower your blood pressure. Taste food before salting. Add only a little salt when you think you need it. With time, your taste buds will adjust to less salt. · Eat fewer snack items, fast foods, canned soups, and other high-salt, high-fat, processed foods. · Read food labels and try to avoid saturated and trans fats. They increase your risk of heart disease by raising cholesterol levels. · Limit the amount of solid fat-butter, margarine, and shortening-you eat. Use olive, peanut, or canola oil when you cook. Bake, broil, and steam foods instead of frying them. · Eat a variety of fruit and vegetables every day. Dark green, deep orange, red, or yellow fruits and vegetables are especially good for you. Examples include spinach, carrots, peaches, and berries. · Foods high in fiber can reduce your cholesterol and provide important vitamins and minerals. High-fiber foods include whole-grain cereals and breads, oatmeal, beans, brown rice, citrus fruits, and apples. · Eat lean proteins. Heart-healthy proteins include seafood, lean meats and poultry, eggs, beans, peas, nuts, seeds, and soy products. · Limit drinks and foods with added sugar. These include candy, desserts, and soda pop. Lifestyle changes  · If your doctor recommends it, get more exercise. Walking is a good choice. Bit by bit, increase the amount you walk every day. Try for at least 30 minutes on most days of the week. You also may want to swim, bike, or do other activities. · Do not smoke.  If you need help quitting, talk to your doctor about stop-smoking programs and medicines. These can increase your chances of quitting for good. Quitting smoking may be the most important step you can take to protect your heart. It is never too late to quit. · Limit alcohol to 2 drinks a day for men and 1 drink a day for women. Too much alcohol can cause health problems. · Manage other health problems such as diabetes, high blood pressure, and high cholesterol. If you think you may have a problem with alcohol or drug use, talk to your doctor. Medicines  · Take your medicines exactly as prescribed. Call your doctor if you think you are having a problem with your medicine. · If your doctor recommends aspirin, take the amount directed each day. Make sure you take aspirin and not another kind of pain reliever, such as acetaminophen (Tylenol). When should you call for help? [id number] if you have symptoms of a heart attack. These may include:  · Chest pain or pressure, or a strange feeling in the chest.  · Sweating. · Shortness of breath. · Pain, pressure, or a strange feeling in the back, neck, jaw, or upper belly or in one or both shoulders or arms. · Lightheadedness or sudden weakness. · A fast or irregular heartbeat. After you call 911, the  may tell you to chew 1 adult-strength or 2 to 4 low-dose aspirin. Wait for an ambulance. Do not try to drive yourself. Watch closely for changes in your health, and be sure to contact your doctor if you have any problems. Where can you learn more? Go to https://[employer]. org and sign in to your [employer] [facility] account. Enter N867 in the [employer] box to learn more about \"A Healthy Heart: Care Instructions. \"     If you do not have an account, please click on the \"Sign Up Now\" link. Current as of: December 16, 2019               Content Version: 12.5  © 7818-4758 [employer]. Care instructions adapted under license by [facility] ([facility]).  If you have

## 2022-01-12 ENCOUNTER — TELEPHONE (OUTPATIENT)
Dept: CARDIOLOGY CLINIC | Age: 67
End: 2022-01-12

## 2022-02-23 ENCOUNTER — OFFICE VISIT (OUTPATIENT)
Dept: CARDIOLOGY CLINIC | Age: 67
End: 2022-02-23
Payer: MEDICARE

## 2022-02-23 VITALS
WEIGHT: 222 LBS | DIASTOLIC BLOOD PRESSURE: 78 MMHG | HEIGHT: 70 IN | BODY MASS INDEX: 31.78 KG/M2 | SYSTOLIC BLOOD PRESSURE: 132 MMHG | HEART RATE: 66 BPM | OXYGEN SATURATION: 98 %

## 2022-02-23 DIAGNOSIS — E78.2 MIXED HYPERLIPIDEMIA: ICD-10-CM

## 2022-02-23 DIAGNOSIS — Z95.5 HISTORY OF CORONARY ARTERY STENT PLACEMENT: ICD-10-CM

## 2022-02-23 DIAGNOSIS — I25.10 CORONARY ARTERY DISEASE INVOLVING NATIVE CORONARY ARTERY OF NATIVE HEART WITHOUT ANGINA PECTORIS: Primary | ICD-10-CM

## 2022-02-23 DIAGNOSIS — Z95.1 S/P CABG X 2: ICD-10-CM

## 2022-02-23 DIAGNOSIS — I10 HYPERTENSION, UNSPECIFIED TYPE: ICD-10-CM

## 2022-02-23 PROCEDURE — 99214 OFFICE O/P EST MOD 30 MIN: CPT | Performed by: NURSE PRACTITIONER

## 2022-02-23 RX ORDER — LISINOPRIL 5 MG/1
5 TABLET ORAL DAILY
Qty: 90 TABLET | Refills: 3 | Status: SHIPPED | OUTPATIENT
Start: 2022-02-23

## 2022-02-23 RX ORDER — LISINOPRIL 5 MG/1
5 TABLET ORAL DAILY
Qty: 90 TABLET | Refills: 3 | Status: CANCELLED | OUTPATIENT
Start: 2022-02-23

## 2022-02-23 NOTE — PATIENT INSTRUCTIONS
New instructions for today:  How to take:  NITROGLYCERIN (Nitrostat) 0.4 mg tablets, sublingual.  Nitroglycerin is in a group of drugs called nitrates. Nitroglycerin dilates (widens) blood vessels, making it easier for blood to flow through them and easier for the heart to pump. Dosing Guidelines for Nitroglycerin Tablets  · At the start of an angina (chest pain) attack, place one tablet under the tongue or between the cheek and gum. Do not swallow or chew the tablet; let it dissolve on its own. If necessary, a second and third tablet may be used, with five minutes between using each tablet. If you use a third tablet and your chest pain continues, it is time to seek immediate medical attention. Call 911 immediately and have someone drive you to the emergency room. You may be having a heart attack or other serious heart problem. · To prevent angina from exercise or stress, use 1 tablet 5 to 10 minutes before the activity. Patient Instructions:  Continue current medications as prescribed. Always keep a current medication list. Bring your medications to every office visit. Continue to follow up with primary care provider for non cardiac medical problems. Call the office with any problems, questions or concerns at [phone]. If you have been asked to keep a blood pressure log, do so for 2 weeks. Call the office to report readings to the triage nurse at [phone]. Follow up with cardiologist as scheduled. The following educational material has been included in this after visit summary for your review: Life simple 7. Heart health. Life simple 7  1) Manage blood pressure - high blood pressure is a major risk factor for heart disease and stroke. Keeping blood pressure in health range reduces strain on your heart, arteries and kidneys. Blood pressure goal is less than 130/80. 2) Control cholesterol - contributes to plaque, which can clog arteries and lead to heart disease and stroke.  When you control your cholesterol you are giving your arteries their best chance to remain clear. It is recommended that you get cholesterol lab work done once a year. 3) Reduce blood sugar - most of the food we eat is turning into glucose or blood sugar that our body uses for energy. Over time, high levels of blood sugar can damage your heart, kidneys, eyes and nerves. 4) Get active - living an active life is one of the most rewarding gifts you can give yourself and those you love. Simply put, daily physical activity increases your length and quality of life. Strive to exercise 15 minutes most days of the week. 5)  Eat better - A healthy diet is one of your best weapons for fighting cardiovascular disease. When you eat a heart healthy diet, you improve your chances for feeling good and staying healthy for life. 6)  Lose weight - when you shed extra fat an unnecessary pounds, you reduce the burden on your hear, lungs, blood vessels and skeleton. You give yourself the gift of active living, you lower your blood pressure and help yourself feel better. 7) Stop smoking - cigarette smokers have a higher risk of developing cardiovascular disease. If  You smoke, quitting is the best thing you can do for your health. Check American Heart Association on line for more information on Life's Simple 7 and tips for healthy living. A Healthy Heart: Care Instructions  Your Care Instructions     Coronary artery disease, also called heart disease, occurs when a substance called plaque builds up in the vessels that supply oxygen-rich blood to your heart muscle. This can narrow the blood vessels and reduce blood flow. A heart attack happens when blood flow is completely blocked. A high-fat diet, smoking, and other factors increase the risk of heart disease. Your doctor has found that you have a chance of having heart disease. You can do lots of things to keep your heart healthy.  It may not be easy, but you can change your diet, exercise more, and quit smoking. These steps really work to lower your chance of heart disease. Follow-up care is a key part of your treatment and safety. Be sure to make and go to all appointments, and call your doctor if you are having problems. It's also a good idea to know your test results and keep a list of the medicines you take. How can you care for yourself at home? Diet  · Use less salt when you cook and eat. This helps lower your blood pressure. Taste food before salting. Add only a little salt when you think you need it. With time, your taste buds will adjust to less salt. · Eat fewer snack items, fast foods, canned soups, and other high-salt, high-fat, processed foods. · Read food labels and try to avoid saturated and trans fats. They increase your risk of heart disease by raising cholesterol levels. · Limit the amount of solid fat-butter, margarine, and shortening-you eat. Use olive, peanut, or canola oil when you cook. Bake, broil, and steam foods instead of frying them. · Eat a variety of fruit and vegetables every day. Dark green, deep orange, red, or yellow fruits and vegetables are especially good for you. Examples include spinach, carrots, peaches, and berries. · Foods high in fiber can reduce your cholesterol and provide important vitamins and minerals. High-fiber foods include whole-grain cereals and breads, oatmeal, beans, brown rice, citrus fruits, and apples. · Eat lean proteins. Heart-healthy proteins include seafood, lean meats and poultry, eggs, beans, peas, nuts, seeds, and soy products. · Limit drinks and foods with added sugar. These include candy, desserts, and soda pop. Lifestyle changes  · If your doctor recommends it, get more exercise. Walking is a good choice. Bit by bit, increase the amount you walk every day. Try for at least 30 minutes on most days of the week. You also may want to swim, bike, or do other activities. · Do not smoke.  If you need help quitting, talk to your doctor about stop-smoking programs and medicines. These can increase your chances of quitting for good. Quitting smoking may be the most important step you can take to protect your heart. It is never too late to quit. · Limit alcohol to 2 drinks a day for men and 1 drink a day for women. Too much alcohol can cause health problems. · Manage other health problems such as diabetes, high blood pressure, and high cholesterol. If you think you may have a problem with alcohol or drug use, talk to your doctor. Medicines  · Take your medicines exactly as prescribed. Call your doctor if you think you are having a problem with your medicine. · If your doctor recommends aspirin, take the amount directed each day. Make sure you take aspirin and not another kind of pain reliever, such as acetaminophen (Tylenol). When should you call for help? [id number] if you have symptoms of a heart attack. These may include:  · Chest pain or pressure, or a strange feeling in the chest.  · Sweating. · Shortness of breath. · Pain, pressure, or a strange feeling in the back, neck, jaw, or upper belly or in one or both shoulders or arms. · Lightheadedness or sudden weakness. · A fast or irregular heartbeat. After you call 911, the  may tell you to chew 1 adult-strength or 2 to 4 low-dose aspirin. Wait for an ambulance. Do not try to drive yourself. Watch closely for changes in your health, and be sure to contact your doctor if you have any problems. Where can you learn more? Go to [email or web address] org and sign in to your [employer] account. Enter A611 in the [facility] box to learn more about \"A Healthy Heart: Care Instructions. \"     If you do not have an account, please click on the \"Sign Up Now\" link. Current as of: December 16, 2019               Content Version: 12.5  © 1370-5690 [employer], Incorporated. Care instructions adapted under license by [facility] ([facility]).  If you have questions about a medical condition or this instruction, always ask your healthcare professional. [place] [address] any warranty or liability for your use of this information.

## 2022-02-23 NOTE — PROGRESS NOTES
[facility], [place]. [address], [facility] [address] [facility]  [phone] office  [phone] fax      OFFICE VISIT:  2/23/2022    Kurt Mcdonald: 1955  Reason For Visit:  Bhavani Loco is a 74 y.o. male who is here for 6 Month Follow-Up (patient denies any cardiac symptoms) and Coronary Artery Disease    History:   Diagnosis Orders   1. Coronary artery disease involving native coronary artery of native heart without angina pectoris     2. S/P CABG x 2     3. History of coronary artery stent placement      5/23/19 PCI to proximal mid and distal circumflex with drug-eluting   stents - Dr. Clinton Amaro   4. Hypertension, unspecified type     5. Mixed hyperlipidemia       The patient presents today for cardiology follow up. Mr. Russel Moreland reports doing very well. The patient denies symptoms to suggest myocardial ischemia, heart failure or arrhythmia. BP is well controlled on current regimen. The patient's PCP monitors cholesterol. Monserrat Briseno denies exertional chest pain, shortness of breath, orthopnea, paroxysmal nocturnal dyspnea, syncope, presyncope, sensed arrhythmia, edema and fatigue. The patient denies numbness or weakness to suggest cerebrovascular accident or transient ischemic attack.       Edgar Summers has the following history as recorded in [facility]:  Patient Active Problem List   Diagnosis Code    Hypertension I10    Chronic obstructive pulmonary disease (COPD) ([facility] [address] J44.9    LICEA (dyspnea on exertion) R06.00    Chest pain R07.9    H/O: rheumatic fever Z86.79    CAD (coronary artery disease) I25.10    S/P CABG x 2 Z95.1    Fatigue R53.83    Hyperlipidemia E78.5    Crescendo angina (HCC) I20.0    History of coronary artery stent placement Z95.5     Past Medical History:   Diagnosis Date    Arthritis     POLYARTICULAR    CAD (coronary artery disease)     CAD (coronary artery disease) 10/31/2012    S/P CABG    Chest pain     Chronic obstructive pulmonary disease (COPD) (HCC)     Depression     Dyspnea     GERD (gastroesophageal reflux disease)     H/O: rheumatic fever     Hyperlipidemia     Cholesterol management per pcp.  Hypertension      Past Surgical History:   Procedure Laterality Date    CARDIAC CATHETERIZATION  9/11/12   MDL    with aortic root injection.  CHOLECYSTECTOMY      COLONOSCOPY      CORONARY ARTERY BYPASS GRAFT  9/14/2012     PACABG X 2, LIMA-LAD, SVG-PLM, TMR, DR Dai Hicks    FRACTURE SURGERY      RT ELBOW, LT LEG AND ANKLE    UPPER GASTROINTESTINAL ENDOSCOPY       Family History   Problem Relation Age of Onset    Heart Attack Father     Heart Disease Father     Hypertension Father      Social History     Tobacco Use    Smoking status: Never Smoker    Smokeless tobacco: Current User     Types: Snuff   Substance Use Topics    Alcohol use: No      Current Outpatient Medications   Medication Sig Dispense Refill    clopidogrel (PLAVIX) 75 MG tablet TAKE 1 TABLET BY MOUTH DAILY 90 tablet 3    metoprolol succinate (TOPROL XL) 50 MG extended release tablet take 1 tablet by mouth daily 90 tablet 3    isosorbide mononitrate (IMDUR) 30 MG extended release tablet Take 1 tablet by mouth daily 30 tablet 5    meloxicam (MOBIC) 15 MG tablet Take 15 mg by mouth as needed       temazepam (RESTORIL) 30 MG capsule Take 30 mg by mouth nightly as needed for Sleep      HYDROcodone-acetaminophen (NORCO) 7.5-325 MG per tablet Take 1 tablet by mouth as needed Back pain      pantoprazole (PROTONIX) 40 MG tablet Take 40 mg by mouth 2 times daily       aspirin 81 MG tablet Take 81 mg by mouth daily.  mometasone-formoterol (DULERA) 100-5 MCG/ACT inhaler Inhale 2 puffs into the lungs 2 times daily.  lisinopril (PRINIVIL;ZESTRIL) 5 MG tablet Take 5 mg by mouth daily.  escitalopram (LEXAPRO) 10 MG tablet Take 10 mg by mouth daily.          No current facility-administered medications for this visit. Allergies: Librium    Review of Systems  Constitutional  no appetite change, or unexpected weight change. No fever, chills or diaphoresis. No significant change in activity level or new onset of fatigue. HEENT  no significant rhinorrhea or epistaxis. No tinnitus or significant hearing loss. Eyes  no sudden vision change or amaurosis. No corneal arcus, xantholasma, subconjunctival hemorrhage or discharge. Respiratory  no significant wheezing, stridor, apnea or cough. No dyspnea on exertion or shortness of air. Cardiovascular  no exertional chest pain to suggest myocardial ischemia. No orthopnea or PND. No sensation of sustained arrythmia. No occurrence of slow heart rate. No palpitations. No claudication. Gastrointestinal  no abdominal swelling or pain. No blood in stool. No severe constipation, diarrhea, nausea, or vomiting. Genitourinary  no dysuria, frequency, or urgency. No flank pain or hematuria. Musculoskeletal  no back pain or myalgia. No problems with gait. Extremities - no clubbing, cyanosis or extremity edema. Skin  no color change or rash. No pallor. No new surgical incision. Neurologic  no speech difficulty, facial asymmetry or lateralizing weakness. No seizures, presyncope or syncope. No significant dizziness. Hematologic  no easy bruising or excessive bleeding. Psychiatric  no severe anxiety or insomnia. No confusion. All other review of systems are negative. Objective  Vital Signs - /78   Pulse 66   Ht 5' 10\" (1.778 m)   Wt 222 lb (100.7 kg)   SpO2 98%   BMI 31.85 kg/m²   [address] is alert, cooperative, and pleasant. Well groomed. No acute distress. Body habitus - Body mass index is 31.85 kg/m². HEENT  Head is normocephalic. No circumoral cyanosis. Dentition is normal.  EYES -   Lids normal without ptosis. No discharge, edema or subconjunctival hemorrhage.    Neck - Symmetrical without apparent mass or lymphadenopathy. Respiratory - Normal respiratory effort without use of accessory muscles. Ausculatation reveals vesicular breath sounds without crackles, wheezes, rub or rhonchi. Cardiovascular  No jugular venous distention. Auscultation reveals regular rate and rhythm. No audible clicks, gallop or rub. No murmur. No lower extremity varicosities. No carotid bruits. Abdominal -  No visible distention, mass or pulsations. Extremities - No clubbing or cyanosis. No statis dermatitis or ulcers. No edema. Musculoskeletal -   No Osler's nodes. No kyphosis or scoliosis. Gait is even and regular without limp or shuffle. Ambulates without assistance. Skin -  Warm and dry; no rash or pallor. No new surgical wound. Neurological - No focal neurological deficits. Thought processes coherent. No apparent tremor. Oriented to person, place and time. Psychiatric -  Appropriate affect and mood. Data reviewed:  5/23/19 cath - Dr. Mariluz Benavides disease with severe dominant circumflex proximal mid and  distal lesions, occluded ostial LAD and occluded nondominant RCA. Patent LIMA to mid LAD. Patent SVG to OM 2 that fails to back fill the rest of circumflex. Borderline normal LV ejection fraction with anterolateral wall motion  abnormality   Successful PCI to proximal mid and distal circumflex with drug-eluting  stents.     Recommendations    Medical management    Signatures   Electronically signed by Sherry Ferrari MD(Performing Physician) on  05/23/2019 09:40    Lab Results   Component Value Date    WBC 3.7 (L) 11/27/2020    HGB 15.1 11/27/2020    HCT 44.5 11/27/2020    MCV 87.6 11/27/2020     11/27/2020     Lab Results   Component Value Date     11/27/2020    K 3.6 11/27/2020     11/27/2020    CO2 24 11/27/2020    BUN 18 11/27/2020    CREATININE 0.9 11/27/2020    GLUCOSE 102 11/27/2020    CALCIUM 8.5 (L) 11/27/2020    PROT 7.1 11/27/2020    LABALBU 3.7 11/27/2020    BILITOT 0.8 11/27/2020    ALKPHOS 60 11/27/2020    AST 21 11/27/2020    ALT 15 11/27/2020    LABGLOM >60 11/27/2020    GFRAA >59 11/27/2020    GLOB 2.3 03/14/2017       Lab Results   Component Value Date    CHOL 211 12/10/2012    CHOL 188 09/13/2012    CHOL 198 09/12/2012     Lab Results   Component Value Date    TRIG 294 (H) 12/10/2012    TRIG 205 (H) 09/13/2012    TRIG 240 (H) 09/12/2012     Lab Results   Component Value Date    HDL 31 12/10/2012    HDL 32 09/13/2012    HDL 32 09/12/2012     Lab Results   Component Value Date    LDLDIRECT 147 12/10/2012       BP Readings from Last 3 Encounters:   02/23/22 132/78   05/26/21 136/80   11/27/20 127/81    Pulse Readings from Last 3 Encounters:   02/23/22 66   05/26/21 68   11/27/20 74        Wt Readings from Last 3 Encounters:   02/23/22 222 lb (100.7 kg)   05/26/21 227 lb (103 kg)   11/27/20 225 lb (102.1 kg)     Assessment/Plan:   Diagnosis Orders   1. Coronary artery disease involving native coronary artery of native heart without angina pectoris     2. S/P CABG x 2     3. History of coronary artery stent placement      5/23/19 PCI to proximal mid and distal circumflex with drug-eluting   stents - Dr. Guera Colin   4. Hypertension, unspecified type     5. Mixed hyperlipidemia       Stable CV status without overt heart failure, sensed arrhythmia or angina. CAD s/p CABG - stable on current medical management. Continue Toprol XL, Lisinopril, Imdur Plavix and ASA. HTN - normotensive on current regimen. BP goal less than 130/80. Continue Toprol XL and Lisinopril. Hyperlipidemia - direct  - not on statin therapy. Obtain medical release for labs from Dr. Brandon Jones as patient needs statin therapy with LDL goal less than 70. Patient is compliant with medication regimen. Previous cardiac history and records reviewed. Continue current medical management for cardiac related condition. Continue other current medications as directed.   Continue to follow up with primary care provider for non cardiac medical problems. If your primary care provider is outside of the [facility], please request that your labs be faxed to this office at [phone]. BP goal 130/80 or less. Call the office with any problems, questions or concerns at [phone]. Cardiology follow up as scheduled in [address] appointments. Educational included in patient instructions. Heart health. NTG SL.       Ulysses Mormon, APRN

## 2022-02-24 ENCOUNTER — TELEPHONE (OUTPATIENT)
Dept: CARDIOTHORACIC SURGERY | Age: 67
End: 2022-02-24

## 2022-02-24 NOTE — TELEPHONE ENCOUNTER
Patient not on statin. Dr. Regulo Leong monitors labs. Patient signed release yesterday in office. Need to review as patient may need statin therapy with hx of CAD. Last direct LDL in epic was 147.     tlm

## 2022-06-06 ENCOUNTER — LAB (OUTPATIENT)
Dept: LAB | Facility: HOSPITAL | Age: 67
End: 2022-06-06

## 2022-06-06 ENCOUNTER — HOSPITAL ENCOUNTER (OUTPATIENT)
Dept: GENERAL RADIOLOGY | Facility: HOSPITAL | Age: 67
Discharge: HOME | End: 2022-06-06

## 2022-06-06 ENCOUNTER — HOSPITAL ENCOUNTER (OUTPATIENT)
Dept: CARDIOLOGY | Facility: HOSPITAL | Age: 67
Discharge: HOME | End: 2022-06-06

## 2022-06-06 ENCOUNTER — TRANSCRIBE ORDERS (OUTPATIENT)
Dept: ADMINISTRATIVE | Facility: HOSPITAL | Age: 67
End: 2022-06-06

## 2022-06-06 DIAGNOSIS — R60.9 EDEMA, UNSPECIFIED TYPE: ICD-10-CM

## 2022-06-06 DIAGNOSIS — R60.9 EDEMA, UNSPECIFIED TYPE: Primary | ICD-10-CM

## 2022-06-06 DIAGNOSIS — R10.9 STOMACH ACHE: ICD-10-CM

## 2022-06-06 DIAGNOSIS — R53.83 TIREDNESS: ICD-10-CM

## 2022-06-06 LAB
ALBUMIN SERPL-MCNC: 4.3 G/DL (ref 3.5–5.2)
ALBUMIN/GLOB SERPL: 1.8 G/DL
ALP SERPL-CCNC: 71 U/L (ref 39–117)
ALT SERPL W P-5'-P-CCNC: 14 U/L (ref 1–41)
ANION GAP SERPL CALCULATED.3IONS-SCNC: 8 MMOL/L (ref 5–15)
AST SERPL-CCNC: 16 U/L (ref 1–40)
BASOPHILS # BLD AUTO: 0.03 10*3/MM3 (ref 0–0.2)
BASOPHILS NFR BLD AUTO: 0.5 % (ref 0–1.5)
BILIRUB SERPL-MCNC: 0.5 MG/DL (ref 0–1.2)
BILIRUB UR QL STRIP: NEGATIVE
BUN SERPL-MCNC: 17 MG/DL (ref 8–23)
BUN/CREAT SERPL: 12.1 (ref 7–25)
CALCIUM SPEC-SCNC: 9.3 MG/DL (ref 8.6–10.5)
CHLORIDE SERPL-SCNC: 107 MMOL/L (ref 98–107)
CLARITY UR: CLEAR
CO2 SERPL-SCNC: 28 MMOL/L (ref 22–29)
COLOR UR: YELLOW
CREAT SERPL-MCNC: 1.4 MG/DL (ref 0.76–1.27)
DEPRECATED RDW RBC AUTO: 46.4 FL (ref 37–54)
EGFRCR SERPLBLD CKD-EPI 2021: 54.4 ML/MIN/1.73
EOSINOPHIL # BLD AUTO: 0.19 10*3/MM3 (ref 0–0.4)
EOSINOPHIL NFR BLD AUTO: 3 % (ref 0.3–6.2)
ERYTHROCYTE [DISTWIDTH] IN BLOOD BY AUTOMATED COUNT: 13.6 % (ref 12.3–15.4)
GLOBULIN UR ELPH-MCNC: 2.4 GM/DL
GLUCOSE SERPL-MCNC: 58 MG/DL (ref 65–99)
GLUCOSE UR STRIP-MCNC: NEGATIVE MG/DL
HCT VFR BLD AUTO: 44.8 % (ref 37.5–51)
HGB BLD-MCNC: 14.9 G/DL (ref 13–17.7)
HGB UR QL STRIP.AUTO: NEGATIVE
IMM GRANULOCYTES # BLD AUTO: 0.02 10*3/MM3 (ref 0–0.05)
IMM GRANULOCYTES NFR BLD AUTO: 0.3 % (ref 0–0.5)
KETONES UR QL STRIP: ABNORMAL
LEUKOCYTE ESTERASE UR QL STRIP.AUTO: NEGATIVE
LYMPHOCYTES # BLD AUTO: 1.52 10*3/MM3 (ref 0.7–3.1)
LYMPHOCYTES NFR BLD AUTO: 23.6 % (ref 19.6–45.3)
MCH RBC QN AUTO: 30.8 PG (ref 26.6–33)
MCHC RBC AUTO-ENTMCNC: 33.3 G/DL (ref 31.5–35.7)
MCV RBC AUTO: 92.6 FL (ref 79–97)
MONOCYTES # BLD AUTO: 0.59 10*3/MM3 (ref 0.1–0.9)
MONOCYTES NFR BLD AUTO: 9.2 % (ref 5–12)
NEUTROPHILS NFR BLD AUTO: 4.09 10*3/MM3 (ref 1.7–7)
NEUTROPHILS NFR BLD AUTO: 63.4 % (ref 42.7–76)
NITRITE UR QL STRIP: NEGATIVE
NRBC BLD AUTO-RTO: 0 /100 WBC (ref 0–0.2)
PH UR STRIP.AUTO: <=5 [PH] (ref 5–8)
PLATELET # BLD AUTO: 183 10*3/MM3 (ref 140–450)
PMV BLD AUTO: 9.5 FL (ref 6–12)
POTASSIUM SERPL-SCNC: 3.9 MMOL/L (ref 3.5–5.2)
PROT SERPL-MCNC: 6.7 G/DL (ref 6–8.5)
PROT UR QL STRIP: NEGATIVE
RBC # BLD AUTO: 4.84 10*6/MM3 (ref 4.14–5.8)
SODIUM SERPL-SCNC: 143 MMOL/L (ref 136–145)
SP GR UR STRIP: 1.02 (ref 1–1.03)
T4 FREE SERPL-MCNC: 1.03 NG/DL (ref 0.93–1.7)
TSH SERPL DL<=0.05 MIU/L-ACNC: 1.09 UIU/ML (ref 0.27–4.2)
UROBILINOGEN UR QL STRIP: ABNORMAL
WBC NRBC COR # BLD: 6.44 10*3/MM3 (ref 3.4–10.8)

## 2022-06-06 PROCEDURE — 84443 ASSAY THYROID STIM HORMONE: CPT | Performed by: NURSE PRACTITIONER

## 2022-06-06 PROCEDURE — 36415 COLL VENOUS BLD VENIPUNCTURE: CPT | Performed by: NURSE PRACTITIONER

## 2022-06-06 PROCEDURE — 93005 ELECTROCARDIOGRAM TRACING: CPT | Performed by: NURSE PRACTITIONER

## 2022-06-06 PROCEDURE — 71046 X-RAY EXAM CHEST 2 VIEWS: CPT

## 2022-06-06 PROCEDURE — 84439 ASSAY OF FREE THYROXINE: CPT | Performed by: NURSE PRACTITIONER

## 2022-06-06 PROCEDURE — 74018 RADEX ABDOMEN 1 VIEW: CPT

## 2022-06-06 PROCEDURE — 84403 ASSAY OF TOTAL TESTOSTERONE: CPT | Performed by: NURSE PRACTITIONER

## 2022-06-06 PROCEDURE — 93010 ELECTROCARDIOGRAM REPORT: CPT | Performed by: INTERNAL MEDICINE

## 2022-06-06 PROCEDURE — 85025 COMPLETE CBC W/AUTO DIFF WBC: CPT | Performed by: NURSE PRACTITIONER

## 2022-06-06 PROCEDURE — 80053 COMPREHEN METABOLIC PANEL: CPT | Performed by: NURSE PRACTITIONER

## 2022-06-06 PROCEDURE — 82607 VITAMIN B-12: CPT | Performed by: NURSE PRACTITIONER

## 2022-06-06 PROCEDURE — 81003 URINALYSIS AUTO W/O SCOPE: CPT | Performed by: NURSE PRACTITIONER

## 2022-06-07 LAB
QT INTERVAL: 414 MS
QTC INTERVAL: 420 MS
TESTOST SERPL-MCNC: 399 NG/DL (ref 193–740)
VIT B12 BLD-MCNC: 298 PG/ML (ref 211–946)

## 2022-06-13 ENCOUNTER — TRANSCRIBE ORDERS (OUTPATIENT)
Dept: ADMINISTRATIVE | Facility: HOSPITAL | Age: 67
End: 2022-06-13

## 2022-06-13 DIAGNOSIS — N20.0 CALCULUS OF KIDNEY: ICD-10-CM

## 2022-06-13 DIAGNOSIS — E16.2 HYPOGLYCEMIA, UNSPECIFIED: Primary | ICD-10-CM

## 2022-06-14 ENCOUNTER — LAB (OUTPATIENT)
Dept: LAB | Facility: HOSPITAL | Age: 67
End: 2022-06-14

## 2022-06-14 DIAGNOSIS — E16.2 HYPOGLYCEMIA, UNSPECIFIED: ICD-10-CM

## 2022-06-14 LAB
GLUCOSE 1H P 100 G GLC PO SERPL-MCNC: 139 MG/DL (ref 74–180)
GLUCOSE 2H P 100 G GLC PO SERPL-MCNC: 120 MG/DL (ref 74–155)
GLUCOSE 3H P 100 G GLC PO SERPL-MCNC: 113 MG/DL (ref 74–140)
GLUCOSE 4H P CHAL SERPL-MCNC: 96 MG/DL (ref 74–106)
GLUCOSE 5H P CHAL SERPL-MCNC: 63 MG/DL (ref 74–106)
GLUCOSE P FAST SERPL-MCNC: 97 MG/DL (ref 74–106)

## 2022-06-14 PROCEDURE — 82951 GLUCOSE TOLERANCE TEST (GTT): CPT

## 2022-06-14 PROCEDURE — 36415 COLL VENOUS BLD VENIPUNCTURE: CPT

## 2022-06-14 PROCEDURE — 82952 GTT-ADDED SAMPLES: CPT

## 2022-06-16 ENCOUNTER — HOSPITAL ENCOUNTER (OUTPATIENT)
Dept: CT IMAGING | Facility: HOSPITAL | Age: 67
Discharge: HOME | End: 2022-06-16
Admitting: NURSE PRACTITIONER

## 2022-06-16 ENCOUNTER — HOSPITAL ENCOUNTER (OUTPATIENT)
Dept: NON INVASIVE DIAGNOSTICS | Age: 67
Discharge: HOME | End: 2022-06-16
Payer: MEDICARE

## 2022-06-16 DIAGNOSIS — R94.31 ABNORMAL EKG: ICD-10-CM

## 2022-06-16 DIAGNOSIS — N20.0 CALCULUS OF KIDNEY: ICD-10-CM

## 2022-06-16 PROCEDURE — 93017 CV STRESS TEST TRACING ONLY: CPT

## 2022-06-16 PROCEDURE — 74176 CT ABD & PELVIS W/O CONTRAST: CPT

## 2022-06-20 ENCOUNTER — TRANSCRIBE ORDERS (OUTPATIENT)
Dept: ADMINISTRATIVE | Facility: HOSPITAL | Age: 67
End: 2022-06-20

## 2022-06-20 ENCOUNTER — HOSPITAL ENCOUNTER (OUTPATIENT)
Dept: GENERAL RADIOLOGY | Facility: HOSPITAL | Age: 67
Discharge: HOME | End: 2022-06-20
Admitting: NURSE PRACTITIONER

## 2022-06-20 DIAGNOSIS — M51.9 LUMBAR DISC DISEASE: Primary | ICD-10-CM

## 2022-06-20 DIAGNOSIS — M51.9 LUMBAR DISC DISEASE: ICD-10-CM

## 2022-06-20 PROCEDURE — 72110 X-RAY EXAM L-2 SPINE 4/>VWS: CPT

## 2022-09-26 ENCOUNTER — TELEPHONE (OUTPATIENT)
Dept: CARDIOLOGY CLINIC | Age: 67
End: 2022-09-26

## 2022-09-27 ENCOUNTER — OFFICE VISIT (OUTPATIENT)
Dept: CARDIOLOGY CLINIC | Age: 67
End: 2022-09-27
Payer: MEDICARE

## 2022-09-27 VITALS
WEIGHT: 208 LBS | BODY MASS INDEX: 29.78 KG/M2 | HEART RATE: 70 BPM | DIASTOLIC BLOOD PRESSURE: 70 MMHG | SYSTOLIC BLOOD PRESSURE: 130 MMHG | OXYGEN SATURATION: 97 % | HEIGHT: 70 IN

## 2022-09-27 DIAGNOSIS — I25.10 CORONARY ARTERY DISEASE INVOLVING NATIVE CORONARY ARTERY OF NATIVE HEART WITHOUT ANGINA PECTORIS: Primary | ICD-10-CM

## 2022-09-27 DIAGNOSIS — Z95.5 HISTORY OF CORONARY ARTERY STENT PLACEMENT: ICD-10-CM

## 2022-09-27 DIAGNOSIS — I10 ESSENTIAL HYPERTENSION: ICD-10-CM

## 2022-09-27 DIAGNOSIS — E78.2 MIXED HYPERLIPIDEMIA: ICD-10-CM

## 2022-09-27 DIAGNOSIS — Z95.1 S/P CABG X 2: ICD-10-CM

## 2022-09-27 PROCEDURE — 99214 OFFICE O/P EST MOD 30 MIN: CPT | Performed by: NURSE PRACTITIONER

## 2022-09-27 PROCEDURE — 1123F ACP DISCUSS/DSCN MKR DOCD: CPT | Performed by: NURSE PRACTITIONER

## 2022-09-27 NOTE — PROGRESS NOTES
[facility], [place]. [address], [facility] [address] [facility]  [phone] office  [phone] fax      OFFICE VISIT:  9/27/2022    Kurt Mcdonald: 1955  Reason For Visit:  Nancy Martines is a 69 y.o. male who is here for 6 Month Follow-Up and Coronary Artery Disease (Patient denies any cardiac symptoms. )    History:   Diagnosis Orders   1. Coronary artery disease involving native coronary artery of native heart without angina pectoris        2. History of coronary artery stent placement        3. Essential hypertension        4. Mixed hyperlipidemia        5. S/P CABG x 2          The patient presents today for cardiology follow up. Mr. Margaret Blue reports doing very well. He has a hx of triple-vessel disease with severe dominant circumflex proximal mid and  distal lesions, occluded ostial LAD and occluded nondominant RCA. Patent LIMA to mid LAD. Patent SVG to OM 2 that fails to back fill the rest of circumflex. Borderline normal LV ejection fraction with anterolateral wall motion abnormality. He had successful PCI to proximal mid and distal circumflex with drug-eluting  stents on 5/23/19. Mr. Margaret Blue underwent a 2 vessel  perfusion -assisted coronary artery bypass grafting, placing the left internal mammary artery to the left anterior descending artery and an individual saphenous vein bypass graft to the posterior lateral marginal branch, along with adjunctive transmyocardial laser revascularization (TMR) on 09/14/2012. The patient denies symptoms to suggest myocardial ischemia, heart failure or arrhythmia. BP is well controlled on current regimen. The patient's PCP monitors cholesterol. Stopped statin due to leg pain. Graciela Calvin denies exertional chest pain, shortness of breath, orthopnea, paroxysmal nocturnal dyspnea, syncope, presyncope, sensed arrhythmia, edema and fatigue.   The patient denies numbness or weakness to suggest cerebrovascular accident or transient ischemic attack. Gia Tan has the following history as recorded in [facility]:  Patient Active Problem List   Diagnosis Code    Hypertension I10    Chronic obstructive pulmonary disease (COPD) ([facility] [address] J44.9    LICEA (dyspnea on exertion) R06.00    Chest pain R07.9    H/O: rheumatic fever Z86.79    CAD (coronary artery disease) I25.10    S/P CABG x 2 Z95.1    Fatigue R53.83    Hyperlipidemia E78.5    Crescendo angina (HCC) I20.0    History of coronary artery stent placement Z95.5     Past Medical History:   Diagnosis Date    Arthritis     POLYARTICULAR    CAD (coronary artery disease)     CAD (coronary artery disease) 10/31/2012    S/P CABG    Chest pain     Chronic obstructive pulmonary disease (COPD) (HCC)     Depression     Dyspnea     GERD (gastroesophageal reflux disease)     H/O: rheumatic fever     Hyperlipidemia     Cholesterol management per pcp. Hypertension      Past Surgical History:   Procedure Laterality Date    CARDIAC CATHETERIZATION  9/11/12   MDL    with aortic root injection.       CHOLECYSTECTOMY      COLONOSCOPY      CORONARY ARTERY BYPASS GRAFT  9/14/2012     PACABG X 2, LIMA-LAD, SVG-PLM, TMR, DR CHOU    FRACTURE SURGERY      RT ELBOW, LT LEG AND ANKLE    UPPER GASTROINTESTINAL ENDOSCOPY       Family History   Problem Relation Age of Onset    Heart Attack Father     Heart Disease Father     Hypertension Father      Social History     Tobacco Use    Smoking status: Never    Smokeless tobacco: Current     Types: Snuff   Substance Use Topics    Alcohol use: No      Current Outpatient Medications   Medication Sig Dispense Refill    lisinopril (PRINIVIL;ZESTRIL) 5 MG tablet Take 1 tablet by mouth daily 90 tablet 3    clopidogrel (PLAVIX) 75 MG tablet TAKE 1 TABLET BY MOUTH DAILY 90 tablet 3    metoprolol succinate (TOPROL XL) 50 MG extended release tablet take 1 tablet by mouth daily 90 tablet 3    isosorbide mononitrate (IMDUR) 30 MG extended release tablet Take 1 tablet by mouth daily 30 tablet 5    meloxicam (MOBIC) 15 MG tablet Take 15 mg by mouth as needed       temazepam (RESTORIL) 30 MG capsule Take 30 mg by mouth nightly as needed for Sleep      HYDROcodone-acetaminophen (NORCO) 7.5-325 MG per tablet Take 1 tablet by mouth as needed Back pain      pantoprazole (PROTONIX) 40 MG tablet Take 40 mg by mouth 2 times daily       aspirin 81 MG tablet Take 81 mg by mouth daily. mometasone-formoterol (DULERA) 100-5 MCG/ACT inhaler Inhale 2 puffs into the lungs 2 times daily. escitalopram (LEXAPRO) 10 MG tablet Take 10 mg by mouth daily. No current facility-administered medications for this visit. Allergies: Librium    Review of Systems  Constitutional - no appetite change, or unexpected weight change. No fever, chills or diaphoresis. No significant change in activity level or new onset of fatigue. HEENT - no significant rhinorrhea or epistaxis. No tinnitus or significant hearing loss. Eyes - no sudden vision change or amaurosis. No corneal arcus, xantholasma, subconjunctival hemorrhage or discharge. Respiratory - no significant wheezing, stridor, apnea or cough. No dyspnea on exertion or shortness of air. Cardiovascular - no exertional chest pain to suggest myocardial ischemia. No orthopnea or PND. No sensation of sustained arrythmia. No occurrence of slow heart rate. No palpitations. No claudication. Gastrointestinal - no abdominal swelling or pain. No blood in stool. No severe constipation, diarrhea, nausea, or vomiting. Genitourinary - no dysuria, frequency, or urgency. No flank pain or hematuria. Musculoskeletal - no back pain or myalgia. No problems with gait. Extremities - no clubbing, cyanosis or extremity edema. Skin - no color change or rash. No pallor. No new surgical incision. Neurologic - no speech difficulty, facial asymmetry or lateralizing weakness.   No seizures, presyncope or syncope. No significant dizziness. Hematologic - no easy bruising or excessive bleeding. Psychiatric - no severe anxiety or insomnia. No confusion. All other review of systems are negative. Objective  Vital Signs - /70   Pulse 70   Ht 5' 10\" (1.778 m)   Wt 208 lb (94.3 kg)   SpO2 97%   BMI 29.84 kg/m²   General - Lucille Vora is alert, cooperative, and pleasant. Well groomed. No acute distress. Body habitus - Body mass index is 29.84 kg/m². HEENT - Head is normocephalic. No circumoral cyanosis. Dentition is normal.  EYES -   Lids normal without ptosis. No discharge, edema or subconjunctival hemorrhage. Neck - Symmetrical without apparent mass or lymphadenopathy. Respiratory - Normal respiratory effort without use of accessory muscles. Ausculatation reveals vesicular breath sounds without crackles, wheezes, rub or rhonchi. Cardiovascular - No jugular venous distention. Auscultation reveals regular rate and rhythm. No audible clicks, gallop or rub. No murmur. No lower extremity varicosities. No carotid bruits. Abdominal -  No visible distention, mass or pulsations. Extremities - No clubbing or cyanosis. No statis dermatitis or ulcers. No edema. Musculoskeletal -   No Osler's nodes. No kyphosis or scoliosis. Gait is even and regular without limp or shuffle. Ambulates without assistance. Skin -  Warm and dry; no rash or pallor. No new surgical wound. Neurological - No focal neurological deficits. Thought processes coherent. No apparent tremor. Oriented to person, place and time. Psychiatric -  Appropriate affect and mood. Data reviewed:  5/23/19 cath - Dr. Ky Reilly disease with severe dominant circumflex proximal mid and  distal lesions, occluded ostial LAD and occluded nondominant RCA. Patent LIMA to mid LAD. Patent SVG to OM 2 that fails to back fill the rest of circumflex.    Borderline normal LV ejection fraction with anterolateral wall motion  abnormality   Successful PCI to proximal mid and distal circumflex with drug-eluting  stents. Recommendations    Medical management   Electronically signed by Vanessa Ferrari MD(Performing Physician) on   05/23/2019 09:40    Lab Results   Component Value Date    WBC 3.7 (L) 11/27/2020    HGB 15.1 11/27/2020    HCT 44.5 11/27/2020    MCV 87.6 11/27/2020     11/27/2020     Lab Results   Component Value Date     11/27/2020    K 3.6 11/27/2020     11/27/2020    CO2 24 11/27/2020    BUN 18 11/27/2020    CREATININE 0.9 11/27/2020    GLUCOSE 102 11/27/2020    CALCIUM 8.5 (L) 11/27/2020    PROT 7.1 11/27/2020    LABALBU 3.7 11/27/2020    BILITOT 0.8 11/27/2020    ALKPHOS 60 11/27/2020    AST 21 11/27/2020    ALT 15 11/27/2020    LABGLOM >60 11/27/2020    GFRAA >59 11/27/2020    GLOB 2.3 03/14/2017       Lab Results   Component Value Date    CHOL 211 12/10/2012    CHOL 188 09/13/2012    CHOL 198 09/12/2012     Lab Results   Component Value Date    TRIG 294 (H) 12/10/2012    TRIG 205 (H) 09/13/2012    TRIG 240 (H) 09/12/2012     Lab Results   Component Value Date    HDL 31 12/10/2012    HDL 32 09/13/2012    HDL 32 09/12/2012     Lab Results   Component Value Date    LDLDIRECT 147 12/10/2012     BP Readings from Last 3 Encounters:   09/27/22 130/70   02/23/22 132/78   05/26/21 136/80    Pulse Readings from Last 3 Encounters:   09/27/22 70   02/23/22 66   05/26/21 68        Wt Readings from Last 3 Encounters:   09/27/22 208 lb (94.3 kg)   02/23/22 222 lb (100.7 kg)   05/26/21 227 lb (103 kg)     Assessment/Plan:   Diagnosis Orders   1. Coronary artery disease involving native coronary artery of native heart without angina pectoris        2. History of coronary artery stent placement        3. Essential hypertension        4. Mixed hyperlipidemia        5. S/P CABG x 2          Stable CV status without overt heart failure, sensed arrhythmia or angina.       CAD s/p CABG - stable on current medical management. Continue Toprol, Lisinopril, Imdur, Plavix and ASA. HTN - normotensive on current regimen. BP today 130/70. Continue same. Hyperlipidemia - monitored and managed by PCP. LDL direct 147. Not currently on statin. Patient is compliant with medication regimen. Previous cardiac history and records reviewed. Continue current medical management for cardiac related condition. Continue other current medications as directed. Continue to follow up with primary care provider for non cardiac medical problems. If your primary care provider is outside of the [facility], please request that your labs be faxed to this office at [phone]. BP goal 130/80 or less. Call the office with any problems, questions or concerns at [phone]. Cardiology follow up as scheduled in [address] appointments. Educational included in patient instructions. Heart health.       STEVENSON Cardenas

## 2022-09-27 NOTE — PATIENT INSTRUCTIONS
New instructions for today:  How to take:  NITROGLYCERIN (Nitrostat) 0.4 mg tablets, sublingual.  Nitroglycerin is in a group of drugs called nitrates. Nitroglycerin dilates (widens) blood vessels, making it easier for blood to flow through them and easier for the heart to pump. Dosing Guidelines for Nitroglycerin Tablets  At the start of an angina (chest pain) attack, place one tablet under the tongue or between the cheek and gum. Do not swallow or chew the tablet; let it dissolve on its own. If necessary, a second and third tablet may be used, with five minutes between using each tablet. If you use a third tablet and your chest pain continues, it is time to seek immediate medical attention. Call 911 immediately and have someone drive you to the emergency room. You may be having a heart attack or other serious heart problem. To prevent angina from exercise or stress, use 1 tablet 5 to 10 minutes before the activity. Patient Instructions:  Continue current medications as prescribed. Always keep a current medication list. Bring your medications to every office visit. Continue to follow up with primary care provider for non cardiac medical problems. Call the office with any problems, questions or concerns at [phone]. If you have been asked to keep a blood pressure log, do so for 2 weeks. Call the office to report readings to the triage nurse at [phone]. Follow up with cardiologist as scheduled. The following educational material has been included in this after visit summary for your review: Life simple 7. Heart health. Life simple 7  1) Manage blood pressure - high blood pressure is a major risk factor for heart disease and stroke. Keeping blood pressure in health range reduces strain on your heart, arteries and kidneys. Blood pressure goal is less than 130/80. 2) Control cholesterol - contributes to plaque, which can clog arteries and lead to heart disease and stroke.  When you control your cholesterol you are giving your arteries their best chance to remain clear. It is recommended that you get cholesterol lab work done once a year. 3) Reduce blood sugar - most of the food we eat is turning into glucose or blood sugar that our body uses for energy. Over time, high levels of blood sugar can damage your heart, kidneys, eyes and nerves. 4) Get active - living an active life is one of the most rewarding gifts you can give yourself and those you love. Simply put, daily physical activity increases your length and quality of life. Strive to exercise 15 minutes most days of the week. 5)  Eat better - A healthy diet is one of your best weapons for fighting cardiovascular disease. When you eat a heart healthy diet, you improve your chances for feeling good and staying healthy for life. 6)  Lose weight - when you shed extra fat an unnecessary pounds, you reduce the burden on your hear, lungs, blood vessels and skeleton. You give yourself the gift of active living, you lower your blood pressure and help yourself feel better. 7) Stop smoking - cigarette smokers have a higher risk of developing cardiovascular disease. If  You smoke, quitting is the best thing you can do for your health. Check American Heart Association on line for more information on Life's Simple 7 and tips for healthy living. A Healthy Heart: Care Instructions  Your Care Instructions     Coronary artery disease, also called heart disease, occurs when a substance called plaque builds up in the vessels that supply oxygen-rich blood to your heart muscle. This can narrow the blood vessels and reduce blood flow. A heart attack happens when blood flow is completely blocked. A high-fat diet, smoking, and other factors increase the risk of heart disease. Your doctor has found that you have a chance of having heart disease. You can do lots of things to keep your heart healthy.  It may not be easy, but you can change your diet, exercise more, and quit smoking. These steps really work to lower your chance of heart disease. Follow-up care is a key part of your treatment and safety. Be sure to make and go to all appointments, and call your doctor if you are having problems. It's also a good idea to know your test results and keep a list of the medicines you take. How can you care for yourself at home? Diet  Use less salt when you cook and eat. This helps lower your blood pressure. Taste food before salting. Add only a little salt when you think you need it. With time, your taste buds will adjust to less salt. Eat fewer snack items, fast foods, canned soups, and other high-salt, high-fat, processed foods. Read food labels and try to avoid saturated and trans fats. They increase your risk of heart disease by raising cholesterol levels. Limit the amount of solid fat-butter, margarine, and shortening-you eat. Use olive, peanut, or canola oil when you cook. Bake, broil, and steam foods instead of frying them. Eat a variety of fruit and vegetables every day. Dark green, deep orange, red, or yellow fruits and vegetables are especially good for you. Examples include spinach, carrots, peaches, and berries. Foods high in fiber can reduce your cholesterol and provide important vitamins and minerals. High-fiber foods include whole-grain cereals and breads, oatmeal, beans, brown rice, citrus fruits, and apples. Eat lean proteins. Heart-healthy proteins include seafood, lean meats and poultry, eggs, beans, peas, nuts, seeds, and soy products. Limit drinks and foods with added sugar. These include candy, desserts, and soda pop. Lifestyle changes  If your doctor recommends it, get more exercise. Walking is a good choice. Bit by bit, increase the amount you walk every day. Try for at least 30 minutes on most days of the week. You also may want to swim, bike, or do other activities. Do not smoke.  If you need help quitting, talk to your doctor

## 2022-12-02 ENCOUNTER — TRANSCRIBE ORDERS (OUTPATIENT)
Dept: ADMINISTRATIVE | Facility: HOSPITAL | Age: 67
End: 2022-12-02

## 2022-12-02 ENCOUNTER — LAB (OUTPATIENT)
Dept: LAB | Facility: HOSPITAL | Age: 67
End: 2022-12-02

## 2022-12-02 ENCOUNTER — HOSPITAL ENCOUNTER (OUTPATIENT)
Dept: GENERAL RADIOLOGY | Facility: HOSPITAL | Age: 67
Discharge: HOME | End: 2022-12-02

## 2022-12-02 DIAGNOSIS — R50.9 FEVER, UNSPECIFIED: ICD-10-CM

## 2022-12-02 DIAGNOSIS — R10.9 ABDOMINAL PAIN, UNSPECIFIED ABDOMINAL LOCATION: Primary | ICD-10-CM

## 2022-12-02 DIAGNOSIS — N18.1 HYPERTENSIVE KIDNEY DISEASE WITH CHRONIC KIDNEY DISEASE STAGE I: ICD-10-CM

## 2022-12-02 DIAGNOSIS — R10.9 ABDOMINAL PAIN, UNSPECIFIED ABDOMINAL LOCATION: ICD-10-CM

## 2022-12-02 DIAGNOSIS — I12.9 HYPERTENSIVE KIDNEY DISEASE WITH CHRONIC KIDNEY DISEASE STAGE I: ICD-10-CM

## 2022-12-02 LAB
ALBUMIN SERPL-MCNC: 4.4 G/DL (ref 3.5–5)
ALBUMIN/GLOB SERPL: 1.3 G/DL (ref 1.1–2.5)
ALP SERPL-CCNC: 77 U/L (ref 24–120)
ALT SERPL W P-5'-P-CCNC: 36 U/L (ref 0–50)
ANION GAP SERPL CALCULATED.3IONS-SCNC: 13 MMOL/L (ref 4–13)
AST SERPL-CCNC: 31 U/L (ref 7–45)
BILIRUB SERPL-MCNC: 0.8 MG/DL (ref 0.1–1)
BILIRUB UR QL STRIP: NEGATIVE
BUN SERPL-MCNC: 13 MG/DL (ref 5–21)
BUN/CREAT SERPL: 10.9
CALCIUM SPEC-SCNC: 9.2 MG/DL (ref 8.4–10.4)
CHLORIDE SERPL-SCNC: 108 MMOL/L (ref 98–110)
CLARITY UR: CLEAR
CO2 SERPL-SCNC: 28 MMOL/L (ref 24–31)
COLOR UR: YELLOW
CREAT SERPL-MCNC: 1.19 MG/DL (ref 0.5–1.4)
EGFRCR SERPLBLD CKD-EPI 2021: 66.1 ML/MIN/1.73
ERYTHROCYTE [DISTWIDTH] IN BLOOD BY AUTOMATED COUNT: 13.6 % (ref 12.3–15.4)
ERYTHROCYTE [SEDIMENTATION RATE] IN BLOOD: 4 MM/HR (ref 0–20)
GLOBULIN UR ELPH-MCNC: 3.3 GM/DL
GLUCOSE SERPL-MCNC: 96 MG/DL (ref 70–100)
GLUCOSE UR STRIP-MCNC: NEGATIVE MG/DL
HCT VFR BLD AUTO: 44.3 % (ref 37.5–51)
HGB BLD-MCNC: 14.9 G/DL (ref 13–17.7)
HGB UR QL STRIP.AUTO: NEGATIVE
KETONES UR QL STRIP: NEGATIVE
LEUKOCYTE ESTERASE UR QL STRIP.AUTO: NEGATIVE
MCH RBC QN AUTO: 29.7 PG (ref 26.6–33)
MCHC RBC AUTO-ENTMCNC: 33.6 G/DL (ref 31.5–35.7)
MCV RBC AUTO: 88.2 FL (ref 79–97)
NITRITE UR QL STRIP: NEGATIVE
PH UR STRIP.AUTO: 6 [PH] (ref 5–8)
PLATELET # BLD AUTO: 216 10*3/MM3 (ref 140–450)
PMV BLD AUTO: 7.9 FL (ref 6–12)
POTASSIUM SERPL-SCNC: 3.9 MMOL/L (ref 3.5–5.3)
PROT SERPL-MCNC: 7.7 G/DL (ref 6.3–8.7)
PROT UR QL STRIP: NEGATIVE
RBC # BLD AUTO: 5.02 10*6/MM3 (ref 4.14–5.8)
SODIUM SERPL-SCNC: 149 MMOL/L (ref 135–145)
SP GR UR STRIP: 1.02 (ref 1–1.03)
UROBILINOGEN UR QL STRIP: NORMAL
WBC NRBC COR # BLD: 7.9 10*3/MM3 (ref 3.4–10.8)

## 2022-12-02 PROCEDURE — 71046 X-RAY EXAM CHEST 2 VIEWS: CPT

## 2022-12-02 PROCEDURE — 85652 RBC SED RATE AUTOMATED: CPT

## 2022-12-02 PROCEDURE — 80053 COMPREHEN METABOLIC PANEL: CPT

## 2022-12-02 PROCEDURE — 36415 COLL VENOUS BLD VENIPUNCTURE: CPT

## 2022-12-02 PROCEDURE — 81003 URINALYSIS AUTO W/O SCOPE: CPT

## 2022-12-02 PROCEDURE — 74018 RADEX ABDOMEN 1 VIEW: CPT

## 2022-12-02 PROCEDURE — 85027 COMPLETE CBC AUTOMATED: CPT

## 2022-12-05 ENCOUNTER — HOSPITAL ENCOUNTER (OUTPATIENT)
Dept: GENERAL RADIOLOGY | Facility: HOSPITAL | Age: 67
Discharge: HOME | End: 2022-12-05
Admitting: NURSE PRACTITIONER

## 2022-12-05 DIAGNOSIS — R10.9 ABDOMINAL PAIN, UNSPECIFIED ABDOMINAL LOCATION: ICD-10-CM

## 2022-12-05 PROCEDURE — 72072 X-RAY EXAM THORAC SPINE 3VWS: CPT

## 2023-03-27 ENCOUNTER — OFFICE VISIT (OUTPATIENT)
Dept: CARDIOLOGY CLINIC | Age: 68
End: 2023-03-27
Payer: MEDICARE

## 2023-03-27 VITALS
BODY MASS INDEX: 30.64 KG/M2 | WEIGHT: 214 LBS | SYSTOLIC BLOOD PRESSURE: 120 MMHG | HEIGHT: 70 IN | HEART RATE: 62 BPM | DIASTOLIC BLOOD PRESSURE: 68 MMHG

## 2023-03-27 DIAGNOSIS — E78.2 MIXED HYPERLIPIDEMIA: ICD-10-CM

## 2023-03-27 DIAGNOSIS — Z95.1 S/P CABG X 2: ICD-10-CM

## 2023-03-27 DIAGNOSIS — I25.10 CORONARY ARTERY DISEASE INVOLVING NATIVE CORONARY ARTERY OF NATIVE HEART WITHOUT ANGINA PECTORIS: Primary | ICD-10-CM

## 2023-03-27 DIAGNOSIS — I10 ESSENTIAL HYPERTENSION: ICD-10-CM

## 2023-03-27 PROCEDURE — 3074F SYST BP LT 130 MM HG: CPT | Performed by: NURSE PRACTITIONER

## 2023-03-27 PROCEDURE — 99214 OFFICE O/P EST MOD 30 MIN: CPT | Performed by: NURSE PRACTITIONER

## 2023-03-27 PROCEDURE — 3078F DIAST BP <80 MM HG: CPT | Performed by: NURSE PRACTITIONER

## 2023-03-27 PROCEDURE — 1123F ACP DISCUSS/DSCN MKR DOCD: CPT | Performed by: NURSE PRACTITIONER

## 2023-03-27 NOTE — PROGRESS NOTES
angina. CAD s/p CABG and stenting - stable on current medical management. Continue same. HTN - normotensive on current regimen. BP today 120/68. Continue same. Hyperlipidemia - monitored and managed by Dr. Evy De La Torre. Patient is compliant with medication regimen. Previous cardiac history and records reviewed. Continue current medical management for cardiac related condition. Continue other current medications as directed. Continue to follow up with primary care provider for non cardiac medical problems. If your primary care provider is outside of the [facility], please request that your labs be faxed to this office at [phone]. BP goal 130/80 or less. Call the office with any problems, questions or concerns at [phone]. Cardiology follow up as scheduled in [address] appointments. Educational included in patient instructions. Heart health.       Martine Langley APRN

## 2023-03-27 NOTE — PATIENT INSTRUCTIONS
instruction, always ask your healthcare professional. [place] [address] any warranty or liability for your use of this information.

## 2023-09-05 ENCOUNTER — TELEPHONE (OUTPATIENT)
Dept: CARDIOLOGY CLINIC | Age: 68
End: 2023-09-05

## 2023-09-06 ENCOUNTER — OFFICE VISIT (OUTPATIENT)
Dept: CARDIOLOGY CLINIC | Age: 68
End: 2023-09-06
Payer: MEDICARE

## 2023-09-06 VITALS
SYSTOLIC BLOOD PRESSURE: 126 MMHG | DIASTOLIC BLOOD PRESSURE: 80 MMHG | HEIGHT: 70 IN | BODY MASS INDEX: 29.92 KG/M2 | HEART RATE: 62 BPM | WEIGHT: 209 LBS

## 2023-09-06 DIAGNOSIS — I25.10 CORONARY ARTERY DISEASE INVOLVING NATIVE CORONARY ARTERY OF NATIVE HEART WITHOUT ANGINA PECTORIS: Primary | ICD-10-CM

## 2023-09-06 DIAGNOSIS — I10 ESSENTIAL HYPERTENSION: ICD-10-CM

## 2023-09-06 PROCEDURE — 1123F ACP DISCUSS/DSCN MKR DOCD: CPT | Performed by: INTERNAL MEDICINE

## 2023-09-06 PROCEDURE — 99214 OFFICE O/P EST MOD 30 MIN: CPT | Performed by: INTERNAL MEDICINE

## 2023-09-06 PROCEDURE — 3079F DIAST BP 80-89 MM HG: CPT | Performed by: INTERNAL MEDICINE

## 2023-09-06 PROCEDURE — 3074F SYST BP LT 130 MM HG: CPT | Performed by: INTERNAL MEDICINE

## 2023-09-06 ASSESSMENT — ENCOUNTER SYMPTOMS
VOMITING: 0
BACK PAIN: 0
WHEEZING: 0
ABDOMINAL PAIN: 0
ABDOMINAL DISTENTION: 0
DIARRHEA: 0
SHORTNESS OF BREATH: 0
BLOOD IN STOOL: 0
COUGH: 0

## 2023-09-06 NOTE — PROGRESS NOTES
12/10/2012 06:30 AM    LDLDIRECT 147 12/10/2012 06:30 AM    TRIG 294 12/10/2012 06:30 AM     LIVER PROFILE:No results for input(s): AST, ALT, LABALBU in the last 72 hours. Imaging:          ASSESSMENT and PLAN:    23-year-old gentleman with past medical history of hypertension, coronary artery disease, prior two-vessel CABG 9/14/2012 with LIMA to LAD, SVG to OM 2, dominant circumflex with patent grafts, undergoing PCI 5/23/2019 to proximal, mid and distal circumflex with WANDA x3, normal LV ejection fraction, here for follow-up evaluation. 1.  He is doing well now 4 years since his PCI with no recurrent symptoms. Good effort tolerance. Can discontinue aspirin and maintain on Plavix alone. Blood pressures well controlled. 2.  Advised on diet and activity. 3.  Can follow-up with nurse practitioner in 6 months. Orders:  No orders of the defined types were placed in this encounter. No orders of the defined types were placed in this encounter. Return for NP 6 mths; me 1 year. Electronically signed by Genna Ragsdale MD on 9/6/2023 at 9:57 AM    [facility]      Thisdictation was generated by voice recognition computer software. Although all attempts are made to edit the dictation for accuracy, there may be errors in the transcription that are not intended.

## 2024-03-04 ENCOUNTER — TELEPHONE (OUTPATIENT)
Dept: CARDIOLOGY CLINIC | Age: 69
End: 2024-03-04

## 2024-03-11 ENCOUNTER — OFFICE VISIT (OUTPATIENT)
Dept: CARDIOLOGY CLINIC | Age: 69
End: 2024-03-11
Payer: MEDICARE

## 2024-03-11 VITALS
DIASTOLIC BLOOD PRESSURE: 88 MMHG | HEART RATE: 64 BPM | BODY MASS INDEX: 32.5 KG/M2 | WEIGHT: 227 LBS | HEIGHT: 70 IN | SYSTOLIC BLOOD PRESSURE: 128 MMHG

## 2024-03-11 DIAGNOSIS — E78.2 MIXED HYPERLIPIDEMIA: ICD-10-CM

## 2024-03-11 DIAGNOSIS — Z95.1 S/P CABG X 2: ICD-10-CM

## 2024-03-11 DIAGNOSIS — I25.10 CORONARY ARTERY DISEASE INVOLVING NATIVE CORONARY ARTERY OF NATIVE HEART WITHOUT ANGINA PECTORIS: Primary | ICD-10-CM

## 2024-03-11 DIAGNOSIS — I10 ESSENTIAL HYPERTENSION: ICD-10-CM

## 2024-03-11 PROCEDURE — 99214 OFFICE O/P EST MOD 30 MIN: CPT | Performed by: NURSE PRACTITIONER

## 2024-03-11 PROCEDURE — 93000 ELECTROCARDIOGRAM COMPLETE: CPT | Performed by: NURSE PRACTITIONER

## 2024-03-11 PROCEDURE — 3074F SYST BP LT 130 MM HG: CPT | Performed by: NURSE PRACTITIONER

## 2024-03-11 PROCEDURE — 3079F DIAST BP 80-89 MM HG: CPT | Performed by: NURSE PRACTITIONER

## 2024-03-11 PROCEDURE — 1123F ACP DISCUSS/DSCN MKR DOCD: CPT | Performed by: NURSE PRACTITIONER

## 2024-03-11 RX ORDER — FLUTICASONE PROPIONATE AND SALMETEROL 250; 50 UG/1; UG/1
POWDER RESPIRATORY (INHALATION)
COMMUNITY
Start: 2023-12-28

## 2024-03-11 RX ORDER — ALLOPURINOL 100 MG/1
100 TABLET ORAL DAILY
COMMUNITY
Start: 2024-01-24

## 2024-03-11 RX ORDER — CIPROFLOXACIN 500 MG/1
500 TABLET, FILM COATED ORAL 2 TIMES DAILY
COMMUNITY
Start: 2024-03-07

## 2024-03-11 NOTE — PROGRESS NOTES
[facility]  [address], [address], [facility]  [address]  [phone] office  [phone] fax      OFFICE VISIT:  3/11/2024    Dru Levy - : 1955  Reason For Visit:  Dru is a 68 y.o. male who is here for 6 Month Follow-Up (No symptoms) and Coronary Artery Disease    History:   Diagnosis Orders   1. Coronary artery disease involving native coronary artery of native heart without angina pectoris        2. Essential hypertension  EKG 12 lead      3. Mixed hyperlipidemia        4. S/P CABG x 2          The patient presents today for cardiology follow up.  The patient denies symptoms to suggest myocardial ischemia, heart failure or arrhythmia.  BP is well controlled on current regimen.  The patient's PCP monitors cholesterol.      Subjective  Dru denies exertional chest pain, shortness of breath, orthopnea, paroxysmal nocturnal dyspnea, syncope, presyncope, sensed arrhythmia, edema and fatigue.  The patient denies numbness or weakness to suggest cerebrovascular accident or transient ischemic attack.      Dru Levy has the following history as recorded in [facility]:  Patient Active Problem List   Diagnosis Code    Hypertension I10    Chronic obstructive pulmonary disease (COPD) ([facility]) J44.9    LICEA (dyspnea on exertion) R06.09    Chest pain R07.9    H/O: rheumatic fever Z86.79    CAD (coronary artery disease) I25.10    S/P CABG x 2 Z95.1    Fatigue R53.83    Hyperlipidemia E78.5    Crescendo angina ([facility]) I20.0    History of coronary artery stent placement Z95.5     Past Medical History:   Diagnosis Date    Arthritis     POLYARTICULAR    CAD (coronary artery disease)     CAD (coronary artery disease) 10/31/2012    S/P CABG    Chest pain     Chronic obstructive pulmonary disease (COPD) ([facility])     Depression     Dyspnea     GERD (gastroesophageal reflux disease)     H/O: rheumatic fever     Hyperlipidemia     Cholesterol

## 2024-03-11 NOTE — PATIENT INSTRUCTIONS
for feeling good and staying healthy for life.  6)  Lose weight - when you shed extra fat an unnecessary pounds, you reduce the burden on your hear, lungs, blood vessels and skeleton.  You give yourself the gift of active living, you lower your blood pressure and help yourself feel better.  7) Stop smoking - cigarette smokers have a higher risk of developing cardiovascular disease.  If  You smoke, quitting is the best thing you can do for your health.  Check American Heart Association on line for more information on Life's Simple 7 and tips for healthy living.     A Healthy Heart: Care Instructions  Your Care Instructions     Coronary artery disease, also called heart disease, occurs when a substance called plaque builds up in the vessels that supply oxygen-rich blood to your heart muscle. This can narrow the blood vessels and reduce blood flow. A heart attack happens when blood flow is completely blocked. A high-fat diet, smoking, and other factors increase the risk of heart disease.  Your doctor has found that you have a chance of having heart disease. You can do lots of things to keep your heart healthy. It may not be easy, but you can change your diet, exercise more, and quit smoking. These steps really work to lower your chance of heart disease.  Follow-up care is a key part of your treatment and safety. Be sure to make and go to all appointments, and call your doctor if you are having problems. It's also a good idea to know your test results and keep a list of the medicines you take.  How can you care for yourself at home?  Diet  Use less salt when you cook and eat. This helps lower your blood pressure. Taste food before salting. Add only a little salt when you think you need it. With time, your taste buds will adjust to less salt.  Eat fewer snack items, fast foods, canned soups, and other high-salt, high-fat, processed foods.  Read food labels and try to avoid saturated and trans fats. They increase your

## 2024-06-06 ENCOUNTER — TELEPHONE (OUTPATIENT)
Dept: CARDIOLOGY CLINIC | Age: 69
End: 2024-06-06

## 2024-06-06 NOTE — TELEPHONE ENCOUNTER
Beatriz with Dr. Francois's [place] office left a message wanting a call back about the records she sent over yesterday. She can be reached at [phone]

## 2024-06-06 NOTE — TELEPHONE ENCOUNTER
Spoke to Beatriz at Dr. Aguayo's office. Confirmed that I did receive patient's updated records and will get patient in sooner to see an APRN.

## 2024-07-02 ENCOUNTER — TELEPHONE (OUTPATIENT)
Dept: CARDIOLOGY CLINIC | Age: 69
End: 2024-07-02

## 2024-07-02 NOTE — TELEPHONE ENCOUNTER
I had received a message that this patient needed to be seen in the office per Dr. Aguayo's office. I made them an appt date and time in a VM. I told the patient in the VM that if the date and time don't work for them to please return call to office. Patient may r/s with any NP that has an opening, They need to be seen as soon as possible. 7/2/24 AH

## 2025-02-13 ENCOUNTER — TELEPHONE (OUTPATIENT)
Dept: CARDIOLOGY CLINIC | Age: 70
End: 2025-02-13

## 2025-02-13 NOTE — TELEPHONE ENCOUNTER
Called and LVM to pt in regards to his upcoming appt with Dr. Ferrari on 2/21. Provider will be on call at the hospital and appt needs to be rescheduled. If patient calls back, please reschedule to either his established NP at their next available or if patient only wishes to see Dr. Ferrari please move appt to August of 2025. 2/13/25 SL

## 2025-04-23 ENCOUNTER — OFFICE VISIT (OUTPATIENT)
Dept: GASTROENTEROLOGY | Facility: CLINIC | Age: 70
End: 2025-04-23
Payer: MEDICARE

## 2025-04-23 ENCOUNTER — TELEPHONE (OUTPATIENT)
Dept: CARDIOLOGY CLINIC | Age: 70
End: 2025-04-23

## 2025-04-23 VITALS
WEIGHT: 227 LBS | BODY MASS INDEX: 31.78 KG/M2 | HEART RATE: 64 BPM | OXYGEN SATURATION: 98 % | DIASTOLIC BLOOD PRESSURE: 80 MMHG | TEMPERATURE: 98.2 F | HEIGHT: 71 IN | SYSTOLIC BLOOD PRESSURE: 126 MMHG

## 2025-04-23 DIAGNOSIS — Z12.11 ENCOUNTER FOR SCREENING FOR MALIGNANT NEOPLASM OF COLON: Primary | ICD-10-CM

## 2025-04-23 PROCEDURE — 1159F MED LIST DOCD IN RCRD: CPT | Performed by: NURSE PRACTITIONER

## 2025-04-23 PROCEDURE — S0260 H&P FOR SURGERY: HCPCS | Performed by: NURSE PRACTITIONER

## 2025-04-23 PROCEDURE — 1160F RVW MEDS BY RX/DR IN RCRD: CPT | Performed by: NURSE PRACTITIONER

## 2025-04-23 RX ORDER — HYDROCODONE BITARTRATE AND ACETAMINOPHEN 7.5; 325 MG/1; MG/1
1 TABLET ORAL EVERY 12 HOURS PRN
COMMUNITY

## 2025-04-23 RX ORDER — FLUTICASONE PROPIONATE AND SALMETEROL 250; 50 UG/1; UG/1
1 POWDER RESPIRATORY (INHALATION)
COMMUNITY

## 2025-04-23 RX ORDER — ATORVASTATIN CALCIUM 20 MG/1
1 TABLET, FILM COATED ORAL DAILY
COMMUNITY

## 2025-04-23 RX ORDER — PANTOPRAZOLE SODIUM 40 MG/1
1 TABLET, DELAYED RELEASE ORAL 2 TIMES DAILY
COMMUNITY

## 2025-04-23 NOTE — PROGRESS NOTES
Primary Physician: Diego Barger MD    Chief Complaint   Patient presents with    Colon Cancer Screening     Pt presents today for evaluation for colonoscopy-pt's last colon was 7/9/2015       Subjective     Jim Singer is a 69 y.o. male.    HPI  Colonoscopy Screening  Pt here for a routine colonoscopy screening.  7/9/2015 Colonoscopy unremarkable.    Pt denies any change in bowel habits. No diarrhea, constipation, abdominal pain or rectal bleeding.    NO family hx colon cancer.    Past Medical History:   Diagnosis Date    Acid reflux     Allergic rhinitis     Asthma     Chronic kidney disease, stage 3a     COPD (chronic obstructive pulmonary disease)     Coronary arteriosclerosis     DIMITRI (generalized anxiety disorder)     Gout     Heart disease     Hypercholesterolemia     Hypertension     Insomnia     Osteoarthritis     PVD (peripheral vascular disease)        Past Surgical History:   Procedure Laterality Date    CARDIAC CATHETERIZATION  2016    CHOLECYSTECTOMY      COLONOSCOPY  07/09/2015    Normal colonoscopy; Repeat 10 years    CORONARY ARTERY BYPASS GRAFT  2012    CORONARY STENT PLACEMENT  2022    3 stents placed during heart cath    ENDOSCOPY  07/09/2015    Small HH; Z-line a bit irregular-biopsies obtained to exclude short-segment Siddiqi's    ENDOSCOPY  02/10/2006    Dr. YangKjptcu-Aynuiyrrj-xruuzs for H.Pylori obtained; Otherwise normal endoscopy without evidence of Siddiqi's    HERNIA REPAIR          Current Outpatient Medications:     allopurinol (ZYLOPRIM) 100 MG tablet, Take 1 tablet by mouth Daily., Disp: , Rfl:     atorvastatin (LIPITOR) 20 MG tablet, Take 1 tablet by mouth Daily., Disp: , Rfl:     escitalopram (LEXAPRO) 10 MG tablet, Take 1 tablet by mouth Daily., Disp: , Rfl:     Fluticasone-Salmeterol (ADVAIR/WIXELA) 250-50 MCG/ACT DISKUS, Inhale 1 puff 2 (Two) Times a Day., Disp: , Rfl:     HYDROcodone-acetaminophen (NORCO) 7.5-325 MG per tablet, Take 1 tablet by mouth Every 12 (Twelve)  "Hours As Needed for Moderate Pain., Disp: , Rfl:     lisinopril (PRINIVIL,ZESTRIL) 10 MG tablet, Take 1 tablet by mouth Daily., Disp: , Rfl:     metoprolol succinate XL (TOPROL-XL) 50 MG 24 hr tablet, Take 1 tablet by mouth Daily., Disp: , Rfl:     pantoprazole (PROTONIX) 40 MG EC tablet, Take 1 tablet by mouth 2 (Two) Times a Day., Disp: , Rfl:     temazepam (RESTORIL) 30 MG capsule, Take 1 capsule by mouth Every Night., Disp: , Rfl:     Allergies   Allergen Reactions    Librium [Chlordiazepoxide] Other (See Comments)     Lethargic       Social History     Socioeconomic History    Marital status:    Tobacco Use    Smoking status: Never    Smokeless tobacco: Current   Vaping Use    Vaping status: Never Used   Substance and Sexual Activity    Alcohol use: No    Drug use: Defer    Sexual activity: Defer       Family History   Problem Relation Age of Onset    Alzheimer's disease Mother     Lung disease Father     Colon cancer Neg Hx     Colon polyps Neg Hx     Esophageal cancer Neg Hx     Liver disease Neg Hx     Rectal cancer Neg Hx     Stomach cancer Neg Hx     Liver cancer Neg Hx        Review of Systems   Constitutional:  Negative for unexpected weight change.   Respiratory:  Negative for shortness of breath.    Cardiovascular:  Negative for chest pain.       Objective     /80 (BP Location: Left arm, Patient Position: Sitting, Cuff Size: Adult)   Pulse 64   Temp 98.2 °F (36.8 °C) (Infrared)   Ht 180.3 cm (71\")   Wt 103 kg (227 lb)   SpO2 98%   BMI 31.66 kg/m²     Physical Exam  Vitals reviewed.   Constitutional:       Appearance: Normal appearance.   Cardiovascular:      Rate and Rhythm: Normal rate and regular rhythm.      Heart sounds: Normal heart sounds.   Pulmonary:      Effort: Pulmonary effort is normal.      Breath sounds: Normal breath sounds.   Neurological:      Mental Status: He is alert.               IMPRESSION/PLAN:    Assessment & Plan      Problem List Items Addressed This Visit  "      Encounter for screening for malignant neoplasm of colon - Primary    Relevant Orders    Case Request (Completed)    Follow Anesthesia Guidelines / Protocol     Colonoscopy per Dr Ericka Hollingsworth Prep          ..The risks, benefits, and alternatives of colonoscopy were reviewed with the patient today.  Risks including perforation of the colon possibly requiring surgery or colostomy.  Additional risks include risk of bleeding from biopsies or removal of colon tissue.  There is also the risk of a drug reaction or problems with anesthesia.  This will be discussed with the further by the anesthesia team on the day of the procedure.  Lastly there is a possibility of missing a colon polyp or cancer.  The benefits include the diagnosis and management of disease of the colon and rectum.  Alternatives to colonoscopy include barium enema, laboratory testing, radiographic evaluation, or no intervention.  The patient verbalizes understanding and agrees.    In accordance with requirements under the Affordable Care Act, [facility] has provided pricing for all hospital services and items on each of its websites. However, a patient's actual cost may differ based on the services the patient receives to meet individual healthcare needs and based on the benefits provided under the patient’s insurance coverage.        Gris Sol, APRN  04/23/25  10:40 CDT    Part of this note may be an electronic transcription/translation of spoken language to printed text.

## 2025-04-23 NOTE — TELEPHONE ENCOUNTER
Called patient to r/s this appt. Felicia Soto will not be available for this appt date and time. Patient may r/s with her next available date and time. 4/23/25 AH

## 2025-06-02 ENCOUNTER — OFFICE VISIT (OUTPATIENT)
Dept: CARDIOLOGY CLINIC | Age: 70
End: 2025-06-02
Payer: MEDICARE

## 2025-06-02 VITALS
SYSTOLIC BLOOD PRESSURE: 128 MMHG | HEART RATE: 63 BPM | BODY MASS INDEX: 32.21 KG/M2 | WEIGHT: 225 LBS | OXYGEN SATURATION: 94 % | DIASTOLIC BLOOD PRESSURE: 84 MMHG | HEIGHT: 70 IN

## 2025-06-02 DIAGNOSIS — Z95.1 S/P CABG X 2: ICD-10-CM

## 2025-06-02 DIAGNOSIS — I10 ESSENTIAL HYPERTENSION: ICD-10-CM

## 2025-06-02 DIAGNOSIS — I25.10 CORONARY ARTERY DISEASE INVOLVING NATIVE CORONARY ARTERY OF NATIVE HEART WITHOUT ANGINA PECTORIS: Primary | ICD-10-CM

## 2025-06-02 DIAGNOSIS — E78.2 MIXED HYPERLIPIDEMIA: ICD-10-CM

## 2025-06-02 PROCEDURE — 93000 ELECTROCARDIOGRAM COMPLETE: CPT | Performed by: NURSE PRACTITIONER

## 2025-06-02 PROCEDURE — 1160F RVW MEDS BY RX/DR IN RCRD: CPT | Performed by: NURSE PRACTITIONER

## 2025-06-02 PROCEDURE — 3074F SYST BP LT 130 MM HG: CPT | Performed by: NURSE PRACTITIONER

## 2025-06-02 PROCEDURE — 1159F MED LIST DOCD IN RCRD: CPT | Performed by: NURSE PRACTITIONER

## 2025-06-02 PROCEDURE — 3079F DIAST BP 80-89 MM HG: CPT | Performed by: NURSE PRACTITIONER

## 2025-06-02 PROCEDURE — 1123F ACP DISCUSS/DSCN MKR DOCD: CPT | Performed by: NURSE PRACTITIONER

## 2025-06-02 PROCEDURE — 99214 OFFICE O/P EST MOD 30 MIN: CPT | Performed by: NURSE PRACTITIONER

## 2025-06-02 NOTE — PATIENT INSTRUCTIONS
New instructions for today:    Patient Instructions:  Continue current medications as prescribed.   Always keep a current medication list. Bring your medications to every office visit.   Continue to follow up with primary care provider for non cardiac medical problems.  Call the office with any problems, questions or concerns at [phone].  If you have been asked to keep a blood pressure log, do so for 2 weeks. Call the office to report readings to the triage nurse at [phone].  Follow up with cardiologist as scheduled.  The following educational material has been included in this after visit summary for your review: Life simple 7.  Heart health.     Life simple 7  1) Manage blood pressure - high blood pressure is a major risk factor for heart disease and stroke. Keeping blood pressure in health range reduces strain on your heart, arteries and kidneys.  Blood pressure goal is less than 130/80.   2) Control cholesterol - contributes to plaque, which can clog arteries and lead to heart disease and stroke. When you control your cholesterol you are giving your arteries their best chance to remain clear.  It is recommended that you get cholesterol lab work done once a year.  3) Reduce blood sugar - most of the food we eat is turning into glucose or blood sugar that our body uses for energy.  Over time, high levels of blood sugar can damage your heart, kidneys, eyes and nerves.  4) Get active - living an active life is one of the most rewarding gifts you can give yourself and those you love.  Simply put, daily physical activity increases your length and quality of life. Strive to exercise 15 minutes most days of the week.  5)  Eat better - A healthy diet is one of your best weapons for fighting cardiovascular disease.  When you eat a heart healthy diet, you improve your chances for feeling good and staying healthy for life.  6)  Lose weight - when you shed extra fat an unnecessary pounds, you reduce the burden on

## 2025-06-02 NOTE — PROGRESS NOTES
[facility]  [address], [address], [place]  [address]  [phone] office  [phone] fax      OFFICE VISIT:  2025    Dru Levy - : 1955  Reason For Visit:  Dru is a 70 y.o. male who is here for 1 Year Follow Up    History:   Diagnosis Orders   1. Coronary artery disease involving native coronary artery of native heart without angina pectoris        2. Essential hypertension  EKG 12 lead      3. Mixed hyperlipidemia  EKG 12 lead      4. S/P CABG x 2          The patient presents today for cardiology follow up.  He is a 70 year old with the following history:  1.  Coronary artery disease, CABG, two-vessel grafting 2012 with LIMA to LAD, SVG to OM 2, dominant circumflex with catheterization 2019 with patent grafts but failure of vein graft to backfill large circumflex, PCI to proximal circumflex (4.0 x 26 mm resolute), mid circumflex (3.0 x 22 mm resolute), distal circumflex (2.5 x 18 mm resolute), normal LV ejection fraction.  2.  Hypertension.    The patient reports doing very well.  He enjoys riding horses.  The patient does not have an established exercise regimen, but reports no decline in functional status.  He does not smoke. The patient has a hx of statin intolerance including Zetia.  Hyperlipidemia is currently followed by PCP.   Plan to obtain recent lipid panel and consider Repatha or Praluent.  The patient denies symptoms to suggest myocardial ischemia, heart failure or arrhythmia.  BP is well controlled on current regimen at 128/84.     Subjective  Dru denies exertional chest pain, shortness of breath, orthopnea, paroxysmal nocturnal dyspnea, syncope, presyncope, sensed arrhythmia, edema and fatigue.  The patient denies numbness or weakness to suggest cerebrovascular accident or transient ischemic attack.    Dru Levy has the following history as recorded in [facility]:  Patient Active

## 2025-06-10 ENCOUNTER — ANESTHESIA EVENT (OUTPATIENT)
Dept: GASTROENTEROLOGY | Facility: HOSPITAL | Age: 70
End: 2025-06-10
Payer: MEDICARE

## 2025-06-10 ENCOUNTER — HOSPITAL ENCOUNTER (OUTPATIENT)
Facility: HOSPITAL | Age: 70
Setting detail: HOSPITAL OUTPATIENT SURGERY
Discharge: HOME | End: 2025-06-10
Attending: INTERNAL MEDICINE | Admitting: INTERNAL MEDICINE
Payer: MEDICARE

## 2025-06-10 ENCOUNTER — ANESTHESIA (OUTPATIENT)
Dept: GASTROENTEROLOGY | Facility: HOSPITAL | Age: 70
End: 2025-06-10
Payer: MEDICARE

## 2025-06-10 VITALS
RESPIRATION RATE: 18 BRPM | HEIGHT: 71 IN | WEIGHT: 224 LBS | OXYGEN SATURATION: 97 % | HEART RATE: 61 BPM | BODY MASS INDEX: 31.36 KG/M2 | SYSTOLIC BLOOD PRESSURE: 147 MMHG | TEMPERATURE: 97.3 F | DIASTOLIC BLOOD PRESSURE: 91 MMHG

## 2025-06-10 DIAGNOSIS — Z12.11 ENCOUNTER FOR SCREENING FOR MALIGNANT NEOPLASM OF COLON: ICD-10-CM

## 2025-06-10 PROCEDURE — 88305 TISSUE EXAM BY PATHOLOGIST: CPT | Performed by: INTERNAL MEDICINE

## 2025-06-10 PROCEDURE — 25810000003 SODIUM CHLORIDE 0.9 % SOLUTION: Performed by: ANESTHESIOLOGY

## 2025-06-10 PROCEDURE — 25010000002 LIDOCAINE PF 2% 2 % SOLUTION: Performed by: NURSE ANESTHETIST, CERTIFIED REGISTERED

## 2025-06-10 PROCEDURE — 45385 COLONOSCOPY W/LESION REMOVAL: CPT | Performed by: INTERNAL MEDICINE

## 2025-06-10 PROCEDURE — 25010000002 PROPOFOL 10 MG/ML EMULSION: Performed by: NURSE ANESTHETIST, CERTIFIED REGISTERED

## 2025-06-10 DEVICE — DEV CLIP ENDO RESOLUTION360 CONTRL ROT 235CM: Type: IMPLANTABLE DEVICE | Site: COLON | Status: FUNCTIONAL

## 2025-06-10 RX ORDER — SODIUM CHLORIDE 9 MG/ML
500 INJECTION, SOLUTION INTRAVENOUS CONTINUOUS PRN
Status: DISCONTINUED | OUTPATIENT
Start: 2025-06-10 | End: 2025-06-10 | Stop reason: HOSPADM

## 2025-06-10 RX ORDER — PROPOFOL 10 MG/ML
VIAL (ML) INTRAVENOUS AS NEEDED
Status: DISCONTINUED | OUTPATIENT
Start: 2025-06-10 | End: 2025-06-10 | Stop reason: SURG

## 2025-06-10 RX ORDER — SODIUM CHLORIDE 0.9 % (FLUSH) 0.9 %
10 SYRINGE (ML) INJECTION AS NEEDED
Status: DISCONTINUED | OUTPATIENT
Start: 2025-06-10 | End: 2025-06-10 | Stop reason: HOSPADM

## 2025-06-10 RX ORDER — LIDOCAINE HYDROCHLORIDE 20 MG/ML
INJECTION, SOLUTION EPIDURAL; INFILTRATION; INTRACAUDAL; PERINEURAL AS NEEDED
Status: DISCONTINUED | OUTPATIENT
Start: 2025-06-10 | End: 2025-06-10 | Stop reason: SURG

## 2025-06-10 RX ADMIN — LIDOCAINE HYDROCHLORIDE 50 MG: 20 INJECTION, SOLUTION EPIDURAL; INFILTRATION; INTRACAUDAL; PERINEURAL at 09:59

## 2025-06-10 RX ADMIN — SODIUM CHLORIDE 500 ML: 9 INJECTION, SOLUTION INTRAVENOUS at 08:18

## 2025-06-10 RX ADMIN — PROPOFOL 240 MG: 10 INJECTION, EMULSION INTRAVENOUS at 09:59

## 2025-06-10 NOTE — ANESTHESIA POSTPROCEDURE EVALUATION
Patient: Jim Singer    Procedure Summary       Date: 06/10/25 Room / Location: [facility] ENDOSCOPY 4 / [facility] ENDOSCOPY    Anesthesia Start: 0958 Anesthesia Stop: 1025    Procedure: COLONOSCOPY WITH ANESTHESIA Diagnosis:       Encounter for screening for malignant neoplasm of colon      (Encounter for screening for malignant neoplasm of colon [Z12.11])    Surgeons: Oj Barnett MD Provider: Robyn Lam CRNA    Anesthesia Type: MAC ASA Status: 3            Anesthesia Type: MAC    Vitals  Vitals Value Taken Time   /87 06/10/25 10:41   Temp     Pulse 62 06/10/25 10:43   Resp 18 06/10/25 10:40   SpO2 96 % 06/10/25 10:43   Vitals shown include unfiled device data.        Post Anesthesia Care and Evaluation    Patient location during evaluation: PHASE II  Patient participation: complete - patient participated  Level of consciousness: awake and alert  Pain management: adequate    Airway patency: patent  Anesthetic complications: No anesthetic complications  PONV Status: none  Cardiovascular status: acceptable  Respiratory status: acceptable  Hydration status: acceptable  No anesthesia care post op

## 2025-06-10 NOTE — H&P
[facility] Gastroenterology  Pre Procedure History & Physical    Chief Complaint:   Screening    Subjective     HPI:   Here for screening colonoscopy    Past Medical History:   Past Medical History:   Diagnosis Date    Acid reflux     Allergic rhinitis     Asthma     Chronic kidney disease, stage 3a     COPD (chronic obstructive pulmonary disease)     Coronary arteriosclerosis     DIMITRI (generalized anxiety disorder)     Gout     Heart disease     Hypercholesterolemia     Hypertension     Insomnia     Osteoarthritis     PVD (peripheral vascular disease)        Past Surgical History:  Past Surgical History:   Procedure Laterality Date    CARDIAC CATHETERIZATION  2016    CHOLECYSTECTOMY      COLONOSCOPY  07/09/2015    Normal colonoscopy; Repeat 10 years    CORONARY ARTERY BYPASS GRAFT  2012    CORONARY STENT PLACEMENT  2022    3 stents placed during heart cath    ENDOSCOPY  07/09/2015    Small HH; Z-line a bit irregular-biopsies obtained to exclude short-segment Siddiqi's    ENDOSCOPY  02/10/2006    Dr. YangLrsqql-Sklyaezts-poeiln for H.Pylori obtained; Otherwise normal endoscopy without evidence of Siddiqi's    HERNIA REPAIR         Family History:  Family History   Problem Relation Age of Onset    Alzheimer's disease Mother     Lung disease Father     Colon cancer Neg Hx     Colon polyps Neg Hx     Esophageal cancer Neg Hx     Liver disease Neg Hx     Rectal cancer Neg Hx     Stomach cancer Neg Hx     Liver cancer Neg Hx        Social History:   reports that he has never smoked. His smokeless tobacco use includes snuff. He reports that he does not drink alcohol and does not use drugs.    Medications:   Prior to Admission medications    Medication Sig Start Date End Date Taking? Authorizing Provider   allopurinol (ZYLOPRIM) 100 MG tablet Take 1 tablet by mouth Daily.   Yes Provider, MD Jeannie   atorvastatin (LIPITOR) 20 MG tablet Take 1 tablet by mouth Daily.   Yes Provider, MD Jeannie   escitalopram (LEXAPRO)  "10 MG tablet Take 1 tablet by mouth Daily.   Yes Jeannie Arrington MD   Fluticasone-Salmeterol (ADVAIR/WIXELA) 250-50 MCG/ACT DISKUS Inhale 1 puff 2 (Two) Times a Day.   Yes Jeannie Arrington MD   HYDROcodone-acetaminophen (NORCO) 7.5-325 MG per tablet Take 1 tablet by mouth Every 12 (Twelve) Hours As Needed for Moderate Pain.   Yes Jeannie Arrington MD   lisinopril (PRINIVIL,ZESTRIL) 10 MG tablet Take 1 tablet by mouth Daily.   Yes Jeannie Arrington MD   metoprolol succinate XL (TOPROL-XL) 50 MG 24 hr tablet Take 1 tablet by mouth Daily.   Yes Jeannie Arrington MD   pantoprazole (PROTONIX) 40 MG EC tablet Take 1 tablet by mouth 2 (Two) Times a Day.   Yes Jeannie Arrington MD   temazepam (RESTORIL) 30 MG capsule Take 1 capsule by mouth Every Night.   Yes Jeannie Arrington MD       Allergies:  Librium [chlordiazepoxide]    Objective     Blood pressure 142/95, pulse 74, temperature 97.3 °F (36.3 °C), temperature source Temporal, resp. rate 18, height 180.3 cm (71\"), weight 102 kg (224 lb), SpO2 95%.    Physical Exam   Constitutional: Pt is oriented to person, place, and in no distress.   HENT: Mouth/Throat: Oropharynx is clear.   Cardiovascular: Normal rate, regular rhythm.    Pulmonary/Chest: Effort normal. No respiratory distress. No  wheezes.   Abdominal: Soft. Non-distended.  Skin: Skin is warm and dry.   Psychiatric: Mood, memory, affect and judgment appear normal.     Assessment & Plan     Diagnosis:  Screening colonoscopy    Anticipated Surgical Procedure:    Proceed with colonoscopy as scheduled    The following major R/B/A were discussed with the patient, however the list is not all inclusive . Risk:  Bleeding (immediate and delayed), perforation (rupture or tear), reaction to medication, missed lesion/cancer, pain during the procedure, infection, need for surgery, need for ostomy, need for mechanical ventilation (breathing machine), death.  Benefits: removal of polyp/tissue, " burn/clip/or inject to stop bleeding, removal of foreign body, dilate any stricture.  Alternatives: Xray or CT, surgery, do nothing with associated risk   The patient was given time to ask question and received explanation, and agrees to proceed as per History and Physical.   No guarantee given or expressed.    EMR Dragon/transcription disclaimer: Much of this encounter note is an electronic transcription/translation of spoken language to printed text.  The electronic translation of spoken language may permit erroneous, or at times, nonsensical words or phrases to be inadvertently transcribed.  Although I have reviewed the note for such errors, some may still exist.    Oj Barnett MD  09:54 CDT  6/10/2025

## 2025-06-11 LAB
CYTO UR: NORMAL
LAB AP CASE REPORT: NORMAL
Lab: NORMAL
PATH REPORT.FINAL DX SPEC: NORMAL
PATH REPORT.GROSS SPEC: NORMAL

## 2025-06-17 ENCOUNTER — RESULTS FOLLOW-UP (OUTPATIENT)
Dept: GASTROENTEROLOGY | Facility: HOSPITAL | Age: 70
End: 2025-06-17
Payer: MEDICARE

## 2025-06-17 NOTE — LETTER
Jim Singer  [address]  [place] [address]        6/17/2025    Jim Singer,    I have reviewed the pathology from your recent colonoscopy, and the report confirmed these polyps to be adenomas. The polyps that were removed DID NOT contain any cancer, however this is the type of tissue that can develop into a cancer over time if not removed. Given these findings, I suggest you have a repeat colonoscopy in 3 years as we discussed at the time of your last procedure. Please svetlana your calendar and call our office 4-6 weeks prior to that date to schedule your office appointment.     My office will send a reminder, however it is your responsibility to arrange this appointment and to notify our office of any address and telephone number changes to help us better assist in your medical care.       Sincerely,        Oj Barnett MD  June 17, 2025 12:59 CDT

## (undated) DEVICE — Device: Brand: SINGLE USE ELECTROSURGICAL SNARE SD-400

## (undated) DEVICE — MASK,OXYGEN,MED CONC,ADLT,7' TUB, UC: Brand: PENDING

## (undated) DEVICE — SENSR O2 OXIMAX FNGR A/ 18IN NONSTR

## (undated) DEVICE — ENDOCUFF VISION PRP SM 10.4

## (undated) DEVICE — DEFENDO AIR WATER SUCTION AND BIOPSY VALVE KIT FOR  OLYMPUS: Brand: DEFENDO AIR/WATER/SUCTION AND BIOPSY VALVE

## (undated) DEVICE — THE SINGLE USE ETRAP – POLYP TRAP IS USED FOR SUCTION RETRIEVAL OF ENDOSCOPICALLY REMOVED POLYPS.: Brand: ETRAP

## (undated) DEVICE — CUFF,BP,DISP,1 TUBE,ADULT,HP: Brand: MEDLINE

## (undated) DEVICE — ARGYLE YANKAUER BULB TIP WITH VENT: Brand: ARGYLE

## (undated) DEVICE — THE CHANNEL CLEANING BRUSH IS A NYLON FLEXI BRUSH ATTACHED TO A FLEXIBLE PLASTIC SHEATH DESIGNED TO SAFELY REMOVE DEBRIS FROM FLEXIBLE ENDOSCOPES.